# Patient Record
Sex: FEMALE | Race: BLACK OR AFRICAN AMERICAN | NOT HISPANIC OR LATINO
[De-identification: names, ages, dates, MRNs, and addresses within clinical notes are randomized per-mention and may not be internally consistent; named-entity substitution may affect disease eponyms.]

---

## 2017-01-08 ENCOUNTER — FORM ENCOUNTER (OUTPATIENT)
Age: 42
End: 2017-01-08

## 2017-01-09 ENCOUNTER — OUTPATIENT (OUTPATIENT)
Dept: OUTPATIENT SERVICES | Facility: HOSPITAL | Age: 42
LOS: 1 days | End: 2017-01-09
Payer: COMMERCIAL

## 2017-01-09 ENCOUNTER — APPOINTMENT (OUTPATIENT)
Dept: ORTHOPEDIC SURGERY | Facility: CLINIC | Age: 42
End: 2017-01-09

## 2017-01-09 DIAGNOSIS — Z98.89 OTHER SPECIFIED POSTPROCEDURAL STATES: Chronic | ICD-10-CM

## 2017-01-09 DIAGNOSIS — Z78.9 OTHER SPECIFIED HEALTH STATUS: ICD-10-CM

## 2017-01-09 PROCEDURE — 73564 X-RAY EXAM KNEE 4 OR MORE: CPT | Mod: 26,LT

## 2017-01-09 PROCEDURE — 73564 X-RAY EXAM KNEE 4 OR MORE: CPT

## 2017-01-09 RX ORDER — VALACYCLOVIR HYDROCHLORIDE 1 G/1
TABLET, FILM COATED ORAL
Refills: 0 | Status: ACTIVE | COMMUNITY

## 2017-01-09 RX ORDER — TOPIRAMATE 50 MG/1
TABLET, FILM COATED ORAL
Refills: 0 | Status: ACTIVE | COMMUNITY

## 2017-01-09 RX ORDER — LEVONORGESTREL AND ETHINYL ESTRADIOL 6-5-10
KIT ORAL
Refills: 0 | Status: ACTIVE | COMMUNITY

## 2017-01-09 RX ORDER — MULTIVITAMIN
TABLET ORAL
Refills: 0 | Status: ACTIVE | COMMUNITY

## 2017-01-09 RX ORDER — IBUPROFEN 800 MG
TABLET ORAL
Refills: 0 | Status: ACTIVE | COMMUNITY

## 2017-02-12 ENCOUNTER — FORM ENCOUNTER (OUTPATIENT)
Age: 42
End: 2017-02-12

## 2017-02-13 ENCOUNTER — APPOINTMENT (OUTPATIENT)
Dept: ORTHOPEDIC SURGERY | Facility: CLINIC | Age: 42
End: 2017-02-13

## 2017-02-13 ENCOUNTER — OUTPATIENT (OUTPATIENT)
Dept: OUTPATIENT SERVICES | Facility: HOSPITAL | Age: 42
LOS: 1 days | End: 2017-02-13
Payer: COMMERCIAL

## 2017-02-13 DIAGNOSIS — M25.551 PAIN IN RIGHT HIP: ICD-10-CM

## 2017-02-13 DIAGNOSIS — Z98.89 OTHER SPECIFIED POSTPROCEDURAL STATES: Chronic | ICD-10-CM

## 2017-02-13 DIAGNOSIS — M70.61 TROCHANTERIC BURSITIS, RIGHT HIP: ICD-10-CM

## 2017-02-13 DIAGNOSIS — M16.11 UNILATERAL PRIMARY OSTEOARTHRITIS, RIGHT HIP: ICD-10-CM

## 2017-02-13 PROCEDURE — 73502 X-RAY EXAM HIP UNI 2-3 VIEWS: CPT

## 2017-02-13 PROCEDURE — 73503 X-RAY EXAM HIP UNI 4/> VIEWS: CPT | Mod: 26,RT

## 2017-02-27 ENCOUNTER — APPOINTMENT (OUTPATIENT)
Dept: ORTHOPEDIC SURGERY | Facility: CLINIC | Age: 42
End: 2017-02-27

## 2017-03-27 ENCOUNTER — APPOINTMENT (OUTPATIENT)
Dept: ORTHOPEDIC SURGERY | Facility: CLINIC | Age: 42
End: 2017-03-27

## 2017-07-07 ENCOUNTER — APPOINTMENT (OUTPATIENT)
Dept: ENDOCRINOLOGY | Facility: CLINIC | Age: 42
End: 2017-07-07
Payer: COMMERCIAL

## 2017-07-07 VITALS
BODY MASS INDEX: 24.63 KG/M2 | DIASTOLIC BLOOD PRESSURE: 58 MMHG | HEART RATE: 52 BPM | SYSTOLIC BLOOD PRESSURE: 109 MMHG | WEIGHT: 139 LBS | HEIGHT: 63 IN

## 2017-07-07 DIAGNOSIS — E16.2 HYPOGLYCEMIA, UNSPECIFIED: ICD-10-CM

## 2017-07-07 PROCEDURE — 99204 OFFICE O/P NEW MOD 45 MIN: CPT | Mod: GC

## 2017-07-07 RX ORDER — BLOOD SUGAR DIAGNOSTIC
STRIP MISCELLANEOUS
Qty: 100 | Refills: 0 | Status: ACTIVE | COMMUNITY
Start: 2017-05-26

## 2017-07-07 RX ORDER — SYRINGE AND NEEDLE,INSULIN,1ML 31 GX5/16"
W/DEVICE SYRINGE, EMPTY DISPOSABLE MISCELLANEOUS
Qty: 1 | Refills: 0 | Status: ACTIVE | COMMUNITY
Start: 2017-05-26

## 2017-07-10 LAB
25(OH)D3 SERPL-MCNC: 32.2 NG/ML
ALBUMIN SERPL ELPH-MCNC: 4.4 G/DL
ALP BLD-CCNC: 44 U/L
ALT SERPL-CCNC: 15 U/L
ANION GAP SERPL CALC-SCNC: 16 MMOL/L
AST SERPL-CCNC: 23 U/L
BASOPHILS # BLD AUTO: 0.02 K/UL
BASOPHILS NFR BLD AUTO: 0.2 %
BILIRUB SERPL-MCNC: 0.2 MG/DL
BUN SERPL-MCNC: 15 MG/DL
C PEPTIDE SERPL-MCNC: 0.9 NG/ML
CALCIUM SERPL-MCNC: 9.5 MG/DL
CHLORIDE SERPL-SCNC: 107 MMOL/L
CHOLEST SERPL-MCNC: 189 MG/DL
CHOLEST/HDLC SERPL: 2.4 RATIO
CO2 SERPL-SCNC: 21 MMOL/L
CORTIS SERPL-MCNC: 22.8 UG/DL
CREAT SERPL-MCNC: 0.95 MG/DL
EOSINOPHIL # BLD AUTO: 0.03 K/UL
EOSINOPHIL NFR BLD AUTO: 0.4 %
GLUCOSE SERPL-MCNC: 73 MG/DL
HBA1C MFR BLD HPLC: 5.2 %
HCT VFR BLD CALC: 37.7 %
HDLC SERPL-MCNC: 80 MG/DL
HGB BLD-MCNC: 12.5 G/DL
IMM GRANULOCYTES NFR BLD AUTO: 0.2 %
INSULIN SERPL-MCNC: 2.9 UU/ML
LDLC SERPL CALC-MCNC: 98 MG/DL
LYMPHOCYTES # BLD AUTO: 2.09 K/UL
LYMPHOCYTES NFR BLD AUTO: 24.9 %
MAN DIFF?: NORMAL
MCHC RBC-ENTMCNC: 32 PG
MCHC RBC-ENTMCNC: 33.2 GM/DL
MCV RBC AUTO: 96.4 FL
MONOCYTES # BLD AUTO: 0.49 K/UL
MONOCYTES NFR BLD AUTO: 5.8 %
NEUTROPHILS # BLD AUTO: 5.74 K/UL
NEUTROPHILS NFR BLD AUTO: 68.5 %
PLATELET # BLD AUTO: 263 K/UL
POTASSIUM SERPL-SCNC: 4.3 MMOL/L
PROT SERPL-MCNC: 7.6 G/DL
RBC # BLD: 3.91 M/UL
RBC # FLD: 13.5 %
SODIUM SERPL-SCNC: 144 MMOL/L
TRIGL SERPL-MCNC: 55 MG/DL
TSH SERPL-ACNC: 2 UIU/ML
WBC # FLD AUTO: 8.39 K/UL

## 2017-07-12 LAB — PROINSULIN SERPL-MCNC: 3 PMOL/L

## 2018-03-05 ENCOUNTER — APPOINTMENT (OUTPATIENT)
Dept: SURGERY | Facility: CLINIC | Age: 43
End: 2018-03-05
Payer: COMMERCIAL

## 2018-03-05 VITALS
HEIGHT: 63 IN | DIASTOLIC BLOOD PRESSURE: 73 MMHG | TEMPERATURE: 98.3 F | HEART RATE: 51 BPM | SYSTOLIC BLOOD PRESSURE: 113 MMHG | WEIGHT: 150 LBS | BODY MASS INDEX: 26.58 KG/M2

## 2018-03-05 DIAGNOSIS — Z83.3 FAMILY HISTORY OF DIABETES MELLITUS: ICD-10-CM

## 2018-03-05 DIAGNOSIS — Z86.2 PERSONAL HISTORY OF DISEASES OF THE BLOOD AND BLOOD-FORMING ORGANS AND CERTAIN DISORDERS INVOLVING THE IMMUNE MECHANISM: ICD-10-CM

## 2018-03-05 DIAGNOSIS — Z82.49 FAMILY HISTORY OF ISCHEMIC HEART DISEASE AND OTHER DISEASES OF THE CIRCULATORY SYSTEM: ICD-10-CM

## 2018-03-05 DIAGNOSIS — Z86.69 PERSONAL HISTORY OF OTHER DISEASES OF THE NERVOUS SYSTEM AND SENSE ORGANS: ICD-10-CM

## 2018-03-05 PROCEDURE — 99213 OFFICE O/P EST LOW 20 MIN: CPT

## 2019-03-28 ENCOUNTER — APPOINTMENT (OUTPATIENT)
Dept: NEUROLOGY | Facility: CLINIC | Age: 44
End: 2019-03-28
Payer: COMMERCIAL

## 2019-03-28 VITALS
WEIGHT: 155 LBS | OXYGEN SATURATION: 100 % | HEIGHT: 63 IN | HEART RATE: 50 BPM | TEMPERATURE: 97.9 F | DIASTOLIC BLOOD PRESSURE: 71 MMHG | BODY MASS INDEX: 27.46 KG/M2 | SYSTOLIC BLOOD PRESSURE: 127 MMHG

## 2019-03-28 PROCEDURE — 99204 OFFICE O/P NEW MOD 45 MIN: CPT

## 2019-03-28 RX ORDER — GLUCOSAMINE/MSM/CHONDROITIN A 500-83-400
500-400-422-83 TABLET ORAL
Refills: 0 | Status: ACTIVE | COMMUNITY

## 2019-03-28 NOTE — HISTORY OF PRESENT ILLNESS
[FreeTextEntry1] : 43 yo W referred by Dr. Savage for evaluation of numbness and tingling in her hands and feet. Symptoms started a few months ago, getting more frequent, worse at night when she's resting, bilateral. Symptoms in hands are worse in 4th and 5th fingers more so on the left. She saw Dr. Cottrell recently who diagnosed her with "diffuse connective tissue disease". She had episodes of hypoglycemia in the past that have now resolved. She also has some joint pain in her fingers. \par \par She was prediabetic about 10 years ago, lost weight and now A1C is normal. She was also taking B12 shots at some point many years ago. \par \par She has a history of fibromyalgia for years. \par \par 10 pt review of systems performed and reviewed with patient\par Past medical history, surgical history, social history, and family history reviewed with patient\par See scanned document for details

## 2019-03-28 NOTE — CONSULT LETTER
[Dear  ___] : Dear  [unfilled], [Consult Letter:] : I had the pleasure of evaluating your patient, [unfilled]. [Please see my note below.] : Please see my note below. [Consult Closing:] : Thank you very much for allowing me to participate in the care of this patient.  If you have any questions, please do not hesitate to contact me. [Sincerely,] : Sincerely, [FreeTextEntry3] : Jacobo Thapa M.D.\par Neurology, Electromyography and Neuromuscular Medicine\par [facility]\par \par  of Neurology\par [facility] / [facility] [employer] [DrMariaelena  ___] : Dr. POLLOCK

## 2019-03-28 NOTE — PHYSICAL EXAM
[FreeTextEntry1] : Gen: appears well, well-nourished, no acute distress\par MS: awake, alert, speech fluent, comprehension intact, follows commands\par CN: PERRL, EOMI, visual fields full, facial strength and sensation intact and symmetric, palate elevation symmetric, tongue midline, no tongue atrophy or fasciculations\par Motor: normal bulk and tone, 5/5 strength throughout, no abnormal movements\par Sensory: Vibration mildly diminished at toes and ankles, PP diminished in length-dependent manner up to ankles b/l; decreased LT/PP first 3 fingers both hands\par Reflexes: 3+ symmetric throughout, no Sharif’s sign, plantar responses flexor bilaterally\par Coordination: no dysmetria on finger to nose, Romberg negative\par Gait: normal, can tandem without difficulty\par Skin: no rash on extremities\par Ext: no edema\par CV: regular rate\par Ophtho: fundi not visualized\par Tinel's sign mildly positive at right elbow, negative left elbow and b/l wrists

## 2019-03-28 NOTE — ASSESSMENT
[FreeTextEntry1] : History and exam consistent with polyneuropathy (large and small fiber) with possible superimposed carpal tunnel and/or ulnar nerve entrapments\par Not bothering her enough to take medication\par Trial nocturnal wrist splinting\par Will contact Dr. Cottrell's office for lab results\par Return for NCS/EMG

## 2019-06-13 ENCOUNTER — APPOINTMENT (OUTPATIENT)
Dept: NEUROLOGY | Facility: CLINIC | Age: 44
End: 2019-06-13
Payer: COMMERCIAL

## 2019-06-13 VITALS
SYSTOLIC BLOOD PRESSURE: 114 MMHG | DIASTOLIC BLOOD PRESSURE: 70 MMHG | TEMPERATURE: 98 F | OXYGEN SATURATION: 100 % | HEART RATE: 64 BPM | BODY MASS INDEX: 28.17 KG/M2 | WEIGHT: 159 LBS | HEIGHT: 63 IN

## 2019-06-13 DIAGNOSIS — R20.2 PARESTHESIA OF SKIN: ICD-10-CM

## 2019-06-13 DIAGNOSIS — Z87.39 PERSONAL HISTORY OF OTHER DISEASES OF THE MUSCULOSKELETAL SYSTEM AND CONNECTIVE TISSUE: ICD-10-CM

## 2019-06-13 PROCEDURE — 95912 NRV CNDJ TEST 11-12 STUDIES: CPT

## 2019-06-13 PROCEDURE — 95886 MUSC TEST DONE W/N TEST COMP: CPT

## 2019-06-13 PROCEDURE — 99214 OFFICE O/P EST MOD 30 MIN: CPT

## 2019-06-13 NOTE — HISTORY OF PRESENT ILLNESS
[FreeTextEntry1] : Still has numbness in pinky fingers, worse on the left\par Has not used braces\par CK repeated at Dr. Cottrell's office this week and was normal; she reports exercising intensely around the time that prior CK was drawn and found to be elevated

## 2019-06-13 NOTE — CONSULT LETTER
[Dear  ___] : Dear  [unfilled], [Courtesy Letter:] : I had the pleasure of seeing your patient, [unfilled], in my office today. [Please see my note below.] : Please see my note below. [Sincerely,] : Sincerely, [Consult Closing:] : Thank you very much for allowing me to participate in the care of this patient.  If you have any questions, please do not hesitate to contact me. [FreeTextEntry3] : Jacobo Thapa M.D.\par Neurology, Electromyography and Neuromuscular Medicine\par [facility]\par \par  of Neurology\par \[facility] [place] [facility]\"" / [facility] [employer]

## 2019-06-13 NOTE — CONSULT LETTER
[Dear  ___] : Dear  [unfilled], [Please see my note below.] : Please see my note below. [Courtesy Letter:] : I had the pleasure of seeing your patient, [unfilled], in my office today. [Sincerely,] : Sincerely, [Consult Closing:] : Thank you very much for allowing me to participate in the care of this patient.  If you have any questions, please do not hesitate to contact me. [FreeTextEntry3] : Jacobo Thapa M.D.\par Neurology, Electromyography and Neuromuscular Medicine\par [facility]\par \par  of Neurology\par [facility] / [facility] [employer]

## 2019-06-13 NOTE — ASSESSMENT
[FreeTextEntry1] : NCS largely unremarkable, perhaps mild left ulnar neuropathy but non-localizable. Suggest left elbow bracing at night for diagnostic and therapeutic purposes\par No median nerve entrapment, polyneuropathy, or myopathy\par Return if symptoms worse or new neurologic symptoms develop\par \par See separate procedure note for full results of study.\par

## 2019-06-13 NOTE — PROCEDURE
[FreeTextEntry1] : Nerve Conduction and Electromyography Report [FreeTextEntry3] : Electro Physiologic Findings:\par \par Limb temperature was monitored and maintained at approximately 36° C in the upper extremities.\par \par The median sensory, motor, and mixed nerve responses were normal bilaterally and symmetric. The ulnar sensory and motor responses were also normal bilaterally, although the dorsal ulnar cutaneous sensory amplitude was somewhat lower on the left than it was on the right. The median and ulnar F-wave latencies were normal bilaterally and symmetric. \par \par Needle electromyography was performed on select left upper extremity C7-T1 appendicular muscles and the left C8 paraspinal. All muscles tested were normal, although evaluation of the paraspinal was somewhat limited due to imperfect relaxation. \par \par Clinical Electrophysiological Impression: \par \par This was an unremarkable electrodiagnostic study of the upper extremities. There was one subtle asymmetry that potentially suggests a mild left ulnar neuropathy proximal to the wrist, however this was not definitive. There was no median entrapment, left lower cervical radiculopathy, myopathy, or upper extremity polyneuropathy on this study.

## 2019-06-13 NOTE — PHYSICAL EXAM
[FreeTextEntry1] : Gen: appears well, well-nourished, no acute distress\par MS: awake, alert, speech fluent, comprehension intact, follows commands\par Motor: normal bulk and tone, 5/5 strength throughout, no abnormal movements\par Sensory: diminished in 5th finger on left\par Reflexes: 2+ symmetric throughout, no Sharif’s sign\par Coordination: no dysmetria on finger to nose, Romberg negative\par Gait: normal\par Tinel's sign negative at elbows and wrists

## 2019-06-13 NOTE — CONSULT LETTER
[Dear  ___] : Dear  [unfilled], [Courtesy Letter:] : I had the pleasure of seeing your patient, [unfilled], in my office today. [Please see my note below.] : Please see my note below. [Consult Closing:] : Thank you very much for allowing me to participate in the care of this patient.  If you have any questions, please do not hesitate to contact me. [Sincerely,] : Sincerely, [FreeTextEntry3] : Jacobo Thapa M.D.\par Neurology, Electromyography and Neuromuscular Medicine\par [facility]\par \par  of Neurology\par [facility] / [facility] [employer]

## 2019-07-09 ENCOUNTER — APPOINTMENT (OUTPATIENT)
Dept: ORTHOPEDIC SURGERY | Facility: CLINIC | Age: 44
End: 2019-07-09
Payer: COMMERCIAL

## 2019-07-09 VITALS
HEIGHT: 63 IN | OXYGEN SATURATION: 99 % | DIASTOLIC BLOOD PRESSURE: 80 MMHG | BODY MASS INDEX: 28.17 KG/M2 | SYSTOLIC BLOOD PRESSURE: 118 MMHG | WEIGHT: 159 LBS | HEART RATE: 65 BPM

## 2019-07-09 DIAGNOSIS — M17.12 UNILATERAL PRIMARY OSTEOARTHRITIS, LEFT KNEE: ICD-10-CM

## 2019-07-09 PROCEDURE — 99214 OFFICE O/P EST MOD 30 MIN: CPT

## 2019-07-12 NOTE — PHYSICAL EXAM
[de-identified] : left knee:\par medial compartment tenderness\par stable v/v\par stable lachman\par negative mcmurrays\par ROM 0-120 [de-identified] : bilateral knee tricompartmental OA grade 2-3. patellas midline. marginal osteophytes.

## 2019-07-12 NOTE — REASON FOR VISIT
[Initial Visit] : an initial visit for [Osteoarthritis, Knee] : osteoarthritis, knee [FreeTextEntry2] : left knee

## 2019-07-12 NOTE — ASSESSMENT
[FreeTextEntry1] : plan:\par -exercises provided\par -she was enrolled in the study\par -she will follow up per protocol

## 2019-07-12 NOTE — REVIEW OF SYSTEMS
[Joint Pain] : joint pain [Joint Swelling] : joint swelling [Negative] : Endocrine [FreeTextEntry9] : left knee

## 2019-07-12 NOTE — HISTORY OF PRESENT ILLNESS
[de-identified] : 44 female with left knee pain for 4 months after beginning crossfit training with lunges and deep squats. she has moderate (grade 2-3) osteoarthritis of the knee. she is averse to medications and invasive treatment and would like to manage this pain naturally. her pain is mostly with stairs and rising from a chair.

## 2019-07-23 ENCOUNTER — APPOINTMENT (OUTPATIENT)
Dept: ORTHOPEDIC SURGERY | Facility: CLINIC | Age: 44
End: 2019-07-23
Payer: COMMERCIAL

## 2019-07-23 PROCEDURE — 99214 OFFICE O/P EST MOD 30 MIN: CPT

## 2019-07-28 NOTE — HISTORY OF PRESENT ILLNESS
[de-identified] : Ms. Skelton injured her dominant right shoulder 2 months ago after doing handstands in a cross fit exercise program. She continues to have pain in the shoulder at night and interrupts her sleep and difficulty using the arm for activities of daily living requiring overhead use. She takes no medicines for pain and has had to continue to curtail her exercise routine. 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

## 2019-07-28 NOTE — CONSULT LETTER
[Dear  ___] : Dear  [unfilled], [FreeTextEntry1] : Today I had the pleasure of evaluating your very nice patient MARILUZ RAMIREZ who requested that I share my findings with you. I very much appreciate the referral. \par \par Please review my office note below and, needless to say, please call or email me with any questions or concerns.\par \par I appreciate the opportunity to participate in her care.\par \par Sincerely,\par \par Rip De La Cruz MD\par Director, Orthopaedic Surgery\par and [facility] [employer] \par formerly [facility]\par Office: [phone]\par Cell: [phone]\par Email: [email or web address] Website: [email or web address] \par \par \par \par

## 2019-07-28 NOTE — DISCUSSION/SUMMARY
[Medication Risks Reviewed] : Medication risks reviewed [Surgical risks reviewed] : Surgical risks reviewed [de-identified] : I reviewed radiographs of both shoulders performed on 6-26-19 that showed no evidence of fracture or dislocation and a well-preserved glenohumeral joint space.\par \par Using a plastic shoulder model, I reviewed the anatomy of the rotator cuff muscles and the glenohumeral joint. I then explained the pathophysiology of impingement syndrome, commonly referred to as bursitis of the shoulder, and the fact that 80% of patients respond to nonoperative treatment.\par \par I then reviewed the nonoperative treatment program which includes activity modification to avoid those activities that exacerbate shoulder pain, Tylenol or anti-inflammatory medications as necessary, rehabilitation exercises focusing on shoulder stretching and rotator cuff muscle strengthening, either independently or with a physical therapist, and, a subacromial cortisone injection for those patients who remain symptomatic following an exercise program. \par \par Finally, I explained that those patients who remain symptomatic after a minimum of 3-4 months of nonoperative treatment and continue to have shoulder pain at night that interrupts sleep and compromises activities of everyday life or sporting activities requiring overhead use are candidates for surgical treatment.\par \par I then gave instructions in a home exercise program for the rotator cuff muscles emphasizing stretching and strengthening of the shoulder and also gave my home exercise sheet which includes illustrations of the exercises. I also reviewed a closed chain strengthening program that may be continued provided that these exercises are performed below shoulder level and at an intensity that does not cause pain. Many patients are cured on this simple rehabilitation exercise program alone.\par I recommended that she begin a course of Tylenol and Aleve to relieve her pain and explained that she may engage in closed chain exercises below shoulder level that do not cause pain.\par \par She should return for my repeat evaluation in 6 weeks if she remains symptomatic at which time I would recommend a subacromial cortisone injection and an MRI to rule out a rotator cuff tear.\par \par Today her clinical right shoulder [employer] score is 58 on a scale of 100 indicating poor shoulder function.\par \par \par

## 2019-07-28 NOTE — PHYSICAL EXAM
[de-identified] : ACONSTITUTIONAL: Patient is well-developed, well-nourished and appropriately groomed.\par SKIN: There are no rashes, no ulcers, and no café au lait spots in the upper extremities, face or cervical region.\par CARDIOVASCULAR: Both distal upper extremities are warm and the fingers have good capillary refill. Normal radial pulses bilaterally.\par RESPIRATORY: The patient is breathing normally and in no acute distress. \par HEMATOLOGICAL/LYMPHATIC: There is no lymphedema in either upper extremity. No cervical adenopathy, no axillary adenopathy.\par PSYCH: The patient is alert and oriented x 3;  appropriately groomed and dressed.\par NEUROLOGIC: Normal gait and station.\par MUSCULOSKELETAL:\par Right shoulder has 160° passive forward elevation, 60° external rotation, and internal rotation to the 12th thoracic vertebra. She can fully actively raise the arm but this is painful. Strength of external rotation is normal and there is no apprehension. She has tenderness and crepitus in the subacromial region, a positive arc of pain, and a positive impingement sign but no tenderness over the acromioclavicular joint.\par \par The contralateral shoulder has full, painless active and passive range of motion with 170° forward elevation, 70° external rotation, and internal rotation to the seventh thoracic vertebra. There is no focal tenderness and no crepitus with passive rotation of the glenohumeral joint.\par \par The cervical spine has full passive and active range of motion without pain. There is no focal tenderness in the paracervical muscles nor in the trapezius or parascapular muscles. Distally the motor and sensory exam is normal in both upper extremities.\par \par Distally, the hands are warm and well perfused with no signs of lymphedema or swelling. The radial pulse is present and normal.

## 2019-08-16 ENCOUNTER — APPOINTMENT (OUTPATIENT)
Dept: INTERNAL MEDICINE | Facility: CLINIC | Age: 44
End: 2019-08-16
Payer: SELF-PAY

## 2019-08-16 VITALS — WEIGHT: 158.75 LBS | BODY MASS INDEX: 28.13 KG/M2 | HEIGHT: 63 IN

## 2019-08-16 PROCEDURE — 00006N: CUSTOM

## 2019-09-12 ENCOUNTER — APPOINTMENT (OUTPATIENT)
Dept: INTERNAL MEDICINE | Facility: CLINIC | Age: 44
End: 2019-09-12
Payer: SELF-PAY

## 2019-09-12 PROCEDURE — 00010N: CUSTOM

## 2019-09-17 ENCOUNTER — APPOINTMENT (OUTPATIENT)
Dept: INTERNAL MEDICINE | Facility: CLINIC | Age: 44
End: 2019-09-17

## 2019-09-17 ENCOUNTER — APPOINTMENT (OUTPATIENT)
Dept: ORTHOPEDIC SURGERY | Facility: CLINIC | Age: 44
End: 2019-09-17
Payer: COMMERCIAL

## 2019-09-17 PROCEDURE — 20611 DRAIN/INJ JOINT/BURSA W/US: CPT | Mod: RT

## 2019-09-17 PROCEDURE — 99212 OFFICE O/P EST SF 10 MIN: CPT | Mod: 25

## 2019-10-11 VITALS — WEIGHT: 156.5 LBS | BODY MASS INDEX: 27.72 KG/M2

## 2019-11-01 ENCOUNTER — APPOINTMENT (OUTPATIENT)
Dept: ORTHOPEDIC SURGERY | Facility: CLINIC | Age: 44
End: 2019-11-01
Payer: COMMERCIAL

## 2019-11-01 VITALS — WEIGHT: 156 LBS | HEIGHT: 63 IN | BODY MASS INDEX: 27.64 KG/M2 | RESPIRATION RATE: 16 BRPM

## 2019-11-01 DIAGNOSIS — M75.41 IMPINGEMENT SYNDROME OF RIGHT SHOULDER: ICD-10-CM

## 2019-11-01 PROCEDURE — 99212 OFFICE O/P EST SF 10 MIN: CPT

## 2019-12-13 VITALS — BODY MASS INDEX: 27.1 KG/M2 | WEIGHT: 153 LBS

## 2019-12-24 ENCOUNTER — APPOINTMENT (OUTPATIENT)
Dept: INTERNAL MEDICINE | Facility: CLINIC | Age: 44
End: 2019-12-24
Payer: COMMERCIAL

## 2019-12-24 PROCEDURE — 97803 MED NUTRITION INDIV SUBSEQ: CPT

## 2020-01-24 VITALS — BODY MASS INDEX: 28.41 KG/M2 | WEIGHT: 160.38 LBS

## 2020-01-27 ENCOUNTER — FORM ENCOUNTER (OUTPATIENT)
Age: 45
End: 2020-01-27

## 2020-01-28 ENCOUNTER — OUTPATIENT (OUTPATIENT)
Dept: OUTPATIENT SERVICES | Facility: HOSPITAL | Age: 45
LOS: 1 days | End: 2020-01-28
Payer: COMMERCIAL

## 2020-01-28 ENCOUNTER — APPOINTMENT (OUTPATIENT)
Dept: ORTHOPEDIC SURGERY | Facility: CLINIC | Age: 45
End: 2020-01-28
Payer: COMMERCIAL

## 2020-01-28 DIAGNOSIS — Z98.89 OTHER SPECIFIED POSTPROCEDURAL STATES: Chronic | ICD-10-CM

## 2020-01-28 DIAGNOSIS — M17.12 UNILATERAL PRIMARY OSTEOARTHRITIS, LEFT KNEE: ICD-10-CM

## 2020-01-28 PROCEDURE — 99213 OFFICE O/P EST LOW 20 MIN: CPT | Mod: 25

## 2020-01-28 PROCEDURE — 73562 X-RAY EXAM OF KNEE 3: CPT

## 2020-01-28 PROCEDURE — 20610 DRAIN/INJ JOINT/BURSA W/O US: CPT | Mod: LT

## 2020-01-28 PROCEDURE — 73562 X-RAY EXAM OF KNEE 3: CPT | Mod: 26,50

## 2020-02-03 NOTE — ASSESSMENT
[FreeTextEntry1] : Assessment/Plan\par \par Left knee OA \par \par The left knee was injected as described above, today they will rest ice and use Tylenol as needed for pain. \par \par F/U in 90 days \par \par \par \par \par All medical record entries made by the PA/Mary/Fellow are at my, Dr. Francois Joshi's direction and personally dictated by me on 01/28/2020]. I have reviewed the chart and agree that the record accurately reflects my personal performance of the history, physical exam, assessment, and plan. I have also personally directed reviewed, and agreed with the chart.\par

## 2020-02-03 NOTE — END OF VISIT
[FreeTextEntry3] : By signing my name below, I, Renee Ramos, attest that this documentation has been prepared under the direction and in the presence of Mane Marie PA-C.

## 2020-02-03 NOTE — PROCEDURE
[de-identified] : After obtaining verbal consent and under normal sterile conditions the left knee joint was injected with 4 cc of lidocaine and 1 cc of 40 mg per mL of Kenalog.\par \par

## 2020-02-03 NOTE — PHYSICAL EXAM
[de-identified] : General: Not in acute distress, dressed appropriately, sitting on examination table\par Skin: Warm and dry, normal turgor, no rashes\par Neurological: AOx3, Cranial nerves grossly in tact\par Psych: Mood and affect appropriate\par \par Left knee:\par Medial compartment tenderness\par Stable v/v\par Stable lachman\par Negative mcmurrays\par ROM 0-120

## 2020-02-03 NOTE — HISTORY OF PRESENT ILLNESS
[de-identified] : Pari is a 44 year old female here for follow up of left knee pain. She is requesting a left knee injection today. Patient has completed research study.

## 2020-02-13 ENCOUNTER — EMERGENCY (EMERGENCY)
Facility: HOSPITAL | Age: 45
LOS: 1 days | Discharge: HOME | End: 2020-02-13
Attending: EMERGENCY MEDICINE | Admitting: EMERGENCY MEDICINE
Payer: COMMERCIAL

## 2020-02-13 ENCOUNTER — NON-APPOINTMENT (OUTPATIENT)
Age: 45
End: 2020-02-13

## 2020-02-13 VITALS
WEIGHT: 160.06 LBS | TEMPERATURE: 98 F | OXYGEN SATURATION: 99 % | SYSTOLIC BLOOD PRESSURE: 119 MMHG | HEART RATE: 63 BPM | DIASTOLIC BLOOD PRESSURE: 82 MMHG | HEIGHT: 63 IN | RESPIRATION RATE: 18 BRPM

## 2020-02-13 VITALS
SYSTOLIC BLOOD PRESSURE: 119 MMHG | RESPIRATION RATE: 18 BRPM | TEMPERATURE: 98 F | DIASTOLIC BLOOD PRESSURE: 70 MMHG | OXYGEN SATURATION: 100 % | HEART RATE: 61 BPM

## 2020-02-13 DIAGNOSIS — R00.2 PALPITATIONS: ICD-10-CM

## 2020-02-13 DIAGNOSIS — Z98.89 OTHER SPECIFIED POSTPROCEDURAL STATES: Chronic | ICD-10-CM

## 2020-02-13 DIAGNOSIS — R07.89 OTHER CHEST PAIN: ICD-10-CM

## 2020-02-13 LAB
ALBUMIN SERPL ELPH-MCNC: 4.3 G/DL — SIGNIFICANT CHANGE UP (ref 3.3–5)
ALP SERPL-CCNC: 63 U/L — SIGNIFICANT CHANGE UP (ref 40–120)
ALT FLD-CCNC: 12 U/L — SIGNIFICANT CHANGE UP (ref 10–45)
ANION GAP SERPL CALC-SCNC: 10 MMOL/L — SIGNIFICANT CHANGE UP (ref 5–17)
APPEARANCE UR: CLEAR — SIGNIFICANT CHANGE UP
AST SERPL-CCNC: 20 U/L — SIGNIFICANT CHANGE UP (ref 10–40)
BASOPHILS # BLD AUTO: 0.06 K/UL — SIGNIFICANT CHANGE UP (ref 0–0.2)
BASOPHILS NFR BLD AUTO: 0.6 % — SIGNIFICANT CHANGE UP (ref 0–2)
BILIRUB SERPL-MCNC: 0.2 MG/DL — SIGNIFICANT CHANGE UP (ref 0.2–1.2)
BILIRUB UR-MCNC: NEGATIVE — SIGNIFICANT CHANGE UP
BUN SERPL-MCNC: 19 MG/DL — SIGNIFICANT CHANGE UP (ref 7–23)
CALCIUM SERPL-MCNC: 9.2 MG/DL — SIGNIFICANT CHANGE UP (ref 8.4–10.5)
CHLORIDE SERPL-SCNC: 106 MMOL/L — SIGNIFICANT CHANGE UP (ref 96–108)
CO2 SERPL-SCNC: 24 MMOL/L — SIGNIFICANT CHANGE UP (ref 22–31)
COLOR SPEC: YELLOW — SIGNIFICANT CHANGE UP
CREAT SERPL-MCNC: 0.95 MG/DL — SIGNIFICANT CHANGE UP (ref 0.5–1.3)
DIFF PNL FLD: NEGATIVE — SIGNIFICANT CHANGE UP
EOSINOPHIL # BLD AUTO: 0.09 K/UL — SIGNIFICANT CHANGE UP (ref 0–0.5)
EOSINOPHIL NFR BLD AUTO: 0.9 % — SIGNIFICANT CHANGE UP (ref 0–6)
GLUCOSE SERPL-MCNC: 93 MG/DL — SIGNIFICANT CHANGE UP (ref 70–99)
GLUCOSE UR QL: NEGATIVE — SIGNIFICANT CHANGE UP
HCT VFR BLD CALC: 36.2 % — SIGNIFICANT CHANGE UP (ref 34.5–45)
HGB BLD-MCNC: 12 G/DL — SIGNIFICANT CHANGE UP (ref 11.5–15.5)
IMM GRANULOCYTES NFR BLD AUTO: 0.2 % — SIGNIFICANT CHANGE UP (ref 0–1.5)
KETONES UR-MCNC: NEGATIVE — SIGNIFICANT CHANGE UP
LEUKOCYTE ESTERASE UR-ACNC: NEGATIVE — SIGNIFICANT CHANGE UP
LYMPHOCYTES # BLD AUTO: 2.78 K/UL — SIGNIFICANT CHANGE UP (ref 1–3.3)
LYMPHOCYTES # BLD AUTO: 27.6 % — SIGNIFICANT CHANGE UP (ref 13–44)
MCHC RBC-ENTMCNC: 32 PG — SIGNIFICANT CHANGE UP (ref 27–34)
MCHC RBC-ENTMCNC: 33.1 GM/DL — SIGNIFICANT CHANGE UP (ref 32–36)
MCV RBC AUTO: 96.5 FL — SIGNIFICANT CHANGE UP (ref 80–100)
MONOCYTES # BLD AUTO: 0.77 K/UL — SIGNIFICANT CHANGE UP (ref 0–0.9)
MONOCYTES NFR BLD AUTO: 7.6 % — SIGNIFICANT CHANGE UP (ref 2–14)
NEUTROPHILS # BLD AUTO: 6.36 K/UL — SIGNIFICANT CHANGE UP (ref 1.8–7.4)
NEUTROPHILS NFR BLD AUTO: 63.1 % — SIGNIFICANT CHANGE UP (ref 43–77)
NITRITE UR-MCNC: NEGATIVE — SIGNIFICANT CHANGE UP
NRBC # BLD: 0 /100 WBCS — SIGNIFICANT CHANGE UP (ref 0–0)
PH UR: 6.5 — SIGNIFICANT CHANGE UP (ref 5–8)
PLATELET # BLD AUTO: 207 K/UL — SIGNIFICANT CHANGE UP (ref 150–400)
POTASSIUM SERPL-MCNC: 4.2 MMOL/L — SIGNIFICANT CHANGE UP (ref 3.5–5.3)
POTASSIUM SERPL-SCNC: 4.2 MMOL/L — SIGNIFICANT CHANGE UP (ref 3.5–5.3)
PROT SERPL-MCNC: 6.8 G/DL — SIGNIFICANT CHANGE UP (ref 6–8.3)
PROT UR-MCNC: NEGATIVE MG/DL — SIGNIFICANT CHANGE UP
RBC # BLD: 3.75 M/UL — LOW (ref 3.8–5.2)
RBC # FLD: 13.1 % — SIGNIFICANT CHANGE UP (ref 10.3–14.5)
SODIUM SERPL-SCNC: 140 MMOL/L — SIGNIFICANT CHANGE UP (ref 135–145)
SP GR SPEC: 1.02 — SIGNIFICANT CHANGE UP (ref 1–1.03)
TROPONIN T SERPL-MCNC: <0.01 NG/ML — SIGNIFICANT CHANGE UP (ref 0–0.01)
TROPONIN T SERPL-MCNC: <0.01 NG/ML — SIGNIFICANT CHANGE UP (ref 0–0.01)
UROBILINOGEN FLD QL: 0.2 E.U./DL — SIGNIFICANT CHANGE UP
WBC # BLD: 10.08 K/UL — SIGNIFICANT CHANGE UP (ref 3.8–10.5)
WBC # FLD AUTO: 10.08 K/UL — SIGNIFICANT CHANGE UP (ref 3.8–10.5)

## 2020-02-13 PROCEDURE — 93010 ELECTROCARDIOGRAM REPORT: CPT

## 2020-02-13 PROCEDURE — 80053 COMPREHEN METABOLIC PANEL: CPT

## 2020-02-13 PROCEDURE — 36415 COLL VENOUS BLD VENIPUNCTURE: CPT

## 2020-02-13 PROCEDURE — 84484 ASSAY OF TROPONIN QUANT: CPT

## 2020-02-13 PROCEDURE — 71046 X-RAY EXAM CHEST 2 VIEWS: CPT | Mod: 26

## 2020-02-13 PROCEDURE — 99283 EMERGENCY DEPT VISIT LOW MDM: CPT | Mod: 25

## 2020-02-13 PROCEDURE — 71046 X-RAY EXAM CHEST 2 VIEWS: CPT

## 2020-02-13 PROCEDURE — 85025 COMPLETE CBC W/AUTO DIFF WBC: CPT

## 2020-02-13 PROCEDURE — 81003 URINALYSIS AUTO W/O SCOPE: CPT

## 2020-02-13 PROCEDURE — 93005 ELECTROCARDIOGRAM TRACING: CPT

## 2020-02-13 PROCEDURE — 99285 EMERGENCY DEPT VISIT HI MDM: CPT | Mod: 25

## 2020-02-13 NOTE — ED ADULT NURSE NOTE - PSH
H/O dilation and curettage    H/O hernia repair    History of arthroscopy of left knee    History of lymph node biopsy  right axilla

## 2020-02-13 NOTE — ED PROVIDER NOTE - NSFOLLOWUPINSTRUCTIONS_ED_ALL_ED_FT
Heart Palpitations    WHAT YOU NEED TO KNOW:    What are heart palpitations? Heart palpitations are feelings that your heart races, jumps, throbs, or flutters. You may feel extra beats, no beats for a short time, or skipped beats. You may have these feelings in your chest, throat, or neck. They may happen when you are sitting, standing, or lying. Heart palpitations may be frightening, but are usually not caused by a serious problem.     What causes heart palpitations?     Anxiety, stress, or lack of sleep      Exercise      A fever or illness      Medicines, such as diet pills, certain cold and allergy medicines, and herbal supplements such as ginseng       Caffeine, nicotine, or illegal drugs such as cocaine      Pregnancy      Medical conditions, such has dehydration, thyroid disease, low blood sugar level, or anemia    How are heart palpitations diagnosed? Your healthcare provider will examine you and ask about your symptoms. Tell the provider if you smoke, use illegal drugs, or have a family history of heart problems. Rarely, heart palpitations may be caused by a more serious condition. Examples include a heart valve problem, heart failure, or a problem with how your heart beats. You may need tests to make sure your palpitations are not caused by a more serious problem:     Blood and urine tests measure your electrolyte, blood cell, and blood sugar levels. Your thyroid hormone levels may also be measured. If you are a woman, your blood or urine may be tested for pregnancy hormones.       An EKG test records your heart rhythm and how fast your heart beats. It is used to check for abnormal heart beats or heart damage. You may also need to wear a Holter monitor while you do your usual activities. A Holter monitor is a portable EKG that you may wear for 24 to 48 hours.     An echocardiogram is a type of ultrasound. Sound waves are used to show the structure and function of your heart.      An exercise stress test helps healthcare providers see how well your heart handles stress. The test can check for blockages in your heart or abnormal heartbeats. Ask your healthcare provider for more information about a stress test.       An electrophysiology study is a procedure to check the electrical pathways in your heart. The electrical pathways control your heartbeat.     How are heart palpitations treated? Palpitations usually do not need treatment. Your healthcare provider may stop or change your medicines if they are causing your palpitations. Conditions that cause palpitations, such as an abnormal heartbeat, will be treated.     What can I do to help prevent heart palpitations?     Manage stress and anxiety. Find ways to relax such as listening to music, meditating, or doing yoga. Exercise can also help decrease stress and anxiety. Talk to someone you trust about your stress or anxiety. You can also talk to a therapist.       Get plenty of sleep every night. Ask your healthcare provider how much sleep you need each night.       Do not drink caffeine or alcohol. Caffeine and alcohol can make your palpitations worse. Caffeine is found in soda, coffee, tea, chocolate, and drinks that increase your energy.       Do not smoke. Nicotine and other chemicals in cigarettes and cigars may damage your heart and blood vessels. Ask your healthcare provider for information if you currently smoke and need help to quit. E-cigarettes or smokeless tobacco still contain nicotine. Talk to your healthcare provider before you use these products.       Do not use illegal drugs. Talk to your healthcare provider if you use illegal drugs and want help to quit.     Call 911 or have someone else call for any of the following:     You have any of the following signs of a heart attack:   Squeezing, pressure, or pain in your chest      You may also have any of the following:   Discomfort or pain in your back, neck, jaw, stomach, or arm      Shortness of breath      Nausea or vomiting      Lightheadedness or a sudden cold sweat      You have any of the following signs of a stroke:   Numbness or drooping on one side of your face       Weakness in an arm or leg      Confusion or difficulty speaking      Dizziness, a severe headache, or vision loss      You faint or lose consciousness.     When should I seek immediate care?     Your palpitations happen more often or last longer than usual.       You have palpitations and shortness of breath, nausea, sweating, or dizziness.     When should I contact my healthcare provider?     You have questions or concerns about your condition or care.      CARE AGREEMENT:    You have the right to help plan your care. Learn about your health condition and how it may be treated. Discuss treatment options with your healthcare providers to decide what care you want to receive. You always have the right to refuse treatment.     Chest Pain    Chest pain can be caused by many different conditions which may or may not be dangerous. Causes include heartburn, lung infections, heart attack, blood clot in lungs, skin infections, strain or damage to muscle, cartilage, or bones, etc. In addition to a history and physical examination, an electrocardiogram (ECG) or other lab tests may have been performed to determine the cause of your chest pain. Follow up with your primary care provider or with a cardiologist as instructed.     SEEK IMMEDIATE MEDICAL CARE IF YOU HAVE ANY OF THE FOLLOWING SYMPTOMS: worsening chest pain, coughing up blood, unexplained back/neck/jaw pain, severe abdominal pain, dizziness or lightheadedness, fainting, shortness of breath, sweaty or clammy skin, vomiting, or racing heart beat. These symptoms may represent a serious problem that is an emergency. Do not wait to see if the symptoms will go away. Get medical help right away. Call 911 and do not drive yourself to the hospital.

## 2020-02-13 NOTE — ED PROVIDER NOTE - PATIENT PORTAL LINK FT
You can access the FollowMyHealth Patient Portal offered by [facility] by registering at the following website: [email or web address]. By joining [email or web address]’s FollowMyHealth portal, you will also be able to view your health information using other applications (apps) compatible with our system.

## 2020-02-13 NOTE — ED ADULT NURSE NOTE - FAMILY HISTORY
Father  Still living? Unknown  Family history of hypertension, Age at diagnosis: Age Unknown  Family history of CABG, Age at diagnosis: Age Unknown  Family history of heart disease, Age at diagnosis: Age Unknown  Family history of hyperlipidemia, Age at diagnosis: Age Unknown     Mother  Still living? Yes, Estimated age: Age Unknown  Family history of hypertension, Age at diagnosis: Age Unknown  Family history of hyperlipidemia, Age at diagnosis: Age Unknown     Grandparent  Still living? Yes, Estimated age: Age Unknown  Family history of hypertension, Age at diagnosis: Age Unknown  Family history of CABG, Age at diagnosis: Age Unknown  Family history of heart disease, Age at diagnosis: Age Unknown  Family history of diabetes mellitus (DM), Age at diagnosis: Age Unknown     Sibling  Still living? Yes, Estimated age: Age Unknown  Family history of borderline diabetes mellitus, Age at diagnosis: Age Unknown  Family history of autism in sibling, Age at diagnosis: Age Unknown

## 2020-02-13 NOTE — ED ADULT NURSE NOTE - CHPI ED NUR SYMPTOMS NEG
no back pain/no congestion/no chills/no dizziness/no nausea/no syncope/no shortness of breath/no diaphoresis/no fever/no vomiting

## 2020-02-13 NOTE — ED PROVIDER NOTE - CLINICAL SUMMARY MEDICAL DECISION MAKING FREE TEXT BOX
midsternal chest pain, intermittent palpitations, no tachycardia, no tachypnea, no hypoxia, doubt pe, doubt acs  -check labs, ekg  -cxr

## 2020-02-13 NOTE — ED PROVIDER NOTE - CARE PROVIDER_API CALL
Froylan Patterson)  Cardiac Electrophysiology; Cardiology; Cardiovascular Disease  [address], [address] [place] [address]  Phone: [phone]  Fax: [phone]  Follow Up Time:     Josiah Grove)  Cardiac Electrophysiology; Cardiovascular Disease  [address] [place] [address], [address] [place] [address]  Phone: [phone]  Fax: [phone]  Follow Up Time:

## 2020-02-13 NOTE — ED PROVIDER NOTE - OBJECTIVE STATEMENT
44F hx fibromyalgia c/o palpitations. pt states they started at rest tonight. states felt like heart was beating hard.  states sometimes felt like heart rate was slow.  some pain to midsternal region. no SOB. no vomiting, no dizziness. no LE swelling. no recent travel. no sick contacts.

## 2020-02-13 NOTE — ED ADULT NURSE NOTE - OBJECTIVE STATEMENT
pt. presents with c/o c/p and palpitations since 2000 last pm, h/o same with cards appointment scheduled, hasn't been able to follow up yet

## 2020-02-13 NOTE — ED ADULT NURSE NOTE - PMH
Borderline glaucoma    Fibromyalgia    Hernia    Herniated intervertebral disc of lumbar spine    Migraine headache    Pre-diabetes    Shingles

## 2020-02-13 NOTE — ED ADULT NURSE NOTE - NSIMPLEMENTINTERV_GEN_ALL_ED
Implemented All Universal Safety Interventions:  [place] to call system. Call bell, personal items and telephone within reach. Instruct patient to call for assistance. Room bathroom lighting operational. Non-slip footwear when patient is off stretcher. Physically safe environment: no spills, clutter or unnecessary equipment. Stretcher in lowest position, wheels locked, appropriate side rails in place.

## 2020-02-13 NOTE — ED PROVIDER NOTE - PROGRESS NOTE DETAILS
no tachycardia throughout ED stay, recommend f/u with cards. serial trop negative, doubt acute ACS, pt may benefit from holter monitor.  I have discussed the discharge plan with the patient. The patient agrees with the plan, as discussed.  The patient understands Emergency Department diagnosis is a preliminary diagnosis often based on limited information and that the patient must adhere to the follow-up plan as discussed.  The patient understands that if the symptoms worsen  the patient may return to the Emergency Department at any time for further evaluation and treatment.

## 2020-02-13 NOTE — ED PROVIDER NOTE - MUSCULOSKELETAL MINIMAL EXAM
[FreeTextEntry1] : The patient had trauma to his right testicle. There is no pain or swelling. There is no hernia. He has no palpitations and his EKG is normal.No treatment is necessary.
neck supple/atraumatic

## 2020-03-31 ENCOUNTER — APPOINTMENT (OUTPATIENT)
Dept: INTERNAL MEDICINE | Facility: CLINIC | Age: 45
End: 2020-03-31
Payer: SELF-PAY

## 2020-03-31 PROCEDURE — 97803 MED NUTRITION INDIV SUBSEQ: CPT | Mod: 95

## 2020-09-14 ENCOUNTER — APPOINTMENT (OUTPATIENT)
Dept: INTERNAL MEDICINE | Facility: CLINIC | Age: 45
End: 2020-09-14
Payer: SELF-PAY

## 2020-09-14 DIAGNOSIS — R73.03 PREDIABETES.: ICD-10-CM

## 2020-09-14 PROCEDURE — 97803 MED NUTRITION INDIV SUBSEQ: CPT | Mod: 95

## 2020-09-15 VITALS — BODY MASS INDEX: 30.29 KG/M2 | WEIGHT: 171 LBS

## 2020-09-15 PROBLEM — R73.03 PRE-DIABETES: Status: ACTIVE | Noted: 2017-07-07

## 2020-09-21 ENCOUNTER — NON-APPOINTMENT (OUTPATIENT)
Age: 45
End: 2020-09-21

## 2020-10-05 ENCOUNTER — NON-APPOINTMENT (OUTPATIENT)
Age: 45
End: 2020-10-05

## 2020-10-12 ENCOUNTER — NON-APPOINTMENT (OUTPATIENT)
Age: 45
End: 2020-10-12

## 2020-10-19 ENCOUNTER — NON-APPOINTMENT (OUTPATIENT)
Age: 45
End: 2020-10-19

## 2020-12-13 ENCOUNTER — EMERGENCY (EMERGENCY)
Facility: HOSPITAL | Age: 45
LOS: 1 days | Discharge: HOME | End: 2020-12-13
Attending: EMERGENCY MEDICINE | Admitting: EMERGENCY MEDICINE
Payer: COMMERCIAL

## 2020-12-13 VITALS
SYSTOLIC BLOOD PRESSURE: 115 MMHG | TEMPERATURE: 98 F | WEIGHT: 177.03 LBS | DIASTOLIC BLOOD PRESSURE: 76 MMHG | HEIGHT: 63 IN | HEART RATE: 68 BPM | OXYGEN SATURATION: 99 % | RESPIRATION RATE: 18 BRPM

## 2020-12-13 VITALS — HEART RATE: 66 BPM | DIASTOLIC BLOOD PRESSURE: 78 MMHG | SYSTOLIC BLOOD PRESSURE: 120 MMHG | TEMPERATURE: 98 F

## 2020-12-13 DIAGNOSIS — Z98.89 OTHER SPECIFIED POSTPROCEDURAL STATES: Chronic | ICD-10-CM

## 2020-12-13 LAB
ALBUMIN SERPL ELPH-MCNC: 4.4 G/DL — SIGNIFICANT CHANGE UP (ref 3.3–5)
ALP SERPL-CCNC: 68 U/L — SIGNIFICANT CHANGE UP (ref 40–120)
ALT FLD-CCNC: 11 U/L — SIGNIFICANT CHANGE UP (ref 10–45)
ANION GAP SERPL CALC-SCNC: 10 MMOL/L — SIGNIFICANT CHANGE UP (ref 5–17)
APTT BLD: 32.3 SEC — SIGNIFICANT CHANGE UP (ref 27.5–35.5)
AST SERPL-CCNC: 19 U/L — SIGNIFICANT CHANGE UP (ref 10–40)
BASOPHILS # BLD AUTO: 0.06 K/UL — SIGNIFICANT CHANGE UP (ref 0–0.2)
BASOPHILS NFR BLD AUTO: 0.5 % — SIGNIFICANT CHANGE UP (ref 0–2)
BILIRUB SERPL-MCNC: 0.2 MG/DL — SIGNIFICANT CHANGE UP (ref 0.2–1.2)
BUN SERPL-MCNC: 17 MG/DL — SIGNIFICANT CHANGE UP (ref 7–23)
CALCIUM SERPL-MCNC: 10.1 MG/DL — SIGNIFICANT CHANGE UP (ref 8.4–10.5)
CHLORIDE SERPL-SCNC: 105 MMOL/L — SIGNIFICANT CHANGE UP (ref 96–108)
CO2 SERPL-SCNC: 27 MMOL/L — SIGNIFICANT CHANGE UP (ref 22–31)
CREAT SERPL-MCNC: 0.85 MG/DL — SIGNIFICANT CHANGE UP (ref 0.5–1.3)
EOSINOPHIL # BLD AUTO: 0.03 K/UL — SIGNIFICANT CHANGE UP (ref 0–0.5)
EOSINOPHIL NFR BLD AUTO: 0.2 % — SIGNIFICANT CHANGE UP (ref 0–6)
GLUCOSE SERPL-MCNC: 96 MG/DL — SIGNIFICANT CHANGE UP (ref 70–99)
HCT VFR BLD CALC: 39.1 % — SIGNIFICANT CHANGE UP (ref 34.5–45)
HGB BLD-MCNC: 12.9 G/DL — SIGNIFICANT CHANGE UP (ref 11.5–15.5)
IMM GRANULOCYTES NFR BLD AUTO: 0.4 % — SIGNIFICANT CHANGE UP (ref 0–1.5)
INR BLD: 0.99 — SIGNIFICANT CHANGE UP (ref 0.88–1.16)
LYMPHOCYTES # BLD AUTO: 17.7 % — SIGNIFICANT CHANGE UP (ref 13–44)
LYMPHOCYTES # BLD AUTO: 2.13 K/UL — SIGNIFICANT CHANGE UP (ref 1–3.3)
MCHC RBC-ENTMCNC: 31.4 PG — SIGNIFICANT CHANGE UP (ref 27–34)
MCHC RBC-ENTMCNC: 33 GM/DL — SIGNIFICANT CHANGE UP (ref 32–36)
MCV RBC AUTO: 95.1 FL — SIGNIFICANT CHANGE UP (ref 80–100)
MONOCYTES # BLD AUTO: 0.63 K/UL — SIGNIFICANT CHANGE UP (ref 0–0.9)
MONOCYTES NFR BLD AUTO: 5.2 % — SIGNIFICANT CHANGE UP (ref 2–14)
NEUTROPHILS # BLD AUTO: 9.12 K/UL — HIGH (ref 1.8–7.4)
NEUTROPHILS NFR BLD AUTO: 76 % — SIGNIFICANT CHANGE UP (ref 43–77)
NRBC # BLD: 0 /100 WBCS — SIGNIFICANT CHANGE UP (ref 0–0)
PLATELET # BLD AUTO: 259 K/UL — SIGNIFICANT CHANGE UP (ref 150–400)
POTASSIUM SERPL-MCNC: 4.5 MMOL/L — SIGNIFICANT CHANGE UP (ref 3.5–5.3)
POTASSIUM SERPL-SCNC: 4.5 MMOL/L — SIGNIFICANT CHANGE UP (ref 3.5–5.3)
PROT SERPL-MCNC: 7.7 G/DL — SIGNIFICANT CHANGE UP (ref 6–8.3)
PROTHROM AB SERPL-ACNC: 11.9 SEC — SIGNIFICANT CHANGE UP (ref 10.6–13.6)
RBC # BLD: 4.11 M/UL — SIGNIFICANT CHANGE UP (ref 3.8–5.2)
RBC # FLD: 13.6 % — SIGNIFICANT CHANGE UP (ref 10.3–14.5)
SODIUM SERPL-SCNC: 142 MMOL/L — SIGNIFICANT CHANGE UP (ref 135–145)
WBC # BLD: 12.02 K/UL — HIGH (ref 3.8–10.5)
WBC # FLD AUTO: 12.02 K/UL — HIGH (ref 3.8–10.5)

## 2020-12-13 PROCEDURE — 85025 COMPLETE CBC W/AUTO DIFF WBC: CPT

## 2020-12-13 PROCEDURE — 70551 MRI BRAIN STEM W/O DYE: CPT

## 2020-12-13 PROCEDURE — 70498 CT ANGIOGRAPHY NECK: CPT

## 2020-12-13 PROCEDURE — 96361 HYDRATE IV INFUSION ADD-ON: CPT

## 2020-12-13 PROCEDURE — 70551 MRI BRAIN STEM W/O DYE: CPT | Mod: 26

## 2020-12-13 PROCEDURE — 36415 COLL VENOUS BLD VENIPUNCTURE: CPT

## 2020-12-13 PROCEDURE — 96365 THER/PROPH/DIAG IV INF INIT: CPT | Mod: XU

## 2020-12-13 PROCEDURE — 80053 COMPREHEN METABOLIC PANEL: CPT

## 2020-12-13 PROCEDURE — 96375 TX/PRO/DX INJ NEW DRUG ADDON: CPT | Mod: XU

## 2020-12-13 PROCEDURE — 96366 THER/PROPH/DIAG IV INF ADDON: CPT

## 2020-12-13 PROCEDURE — 70496 CT ANGIOGRAPHY HEAD: CPT | Mod: 26

## 2020-12-13 PROCEDURE — 70450 CT HEAD/BRAIN W/O DYE: CPT

## 2020-12-13 PROCEDURE — 85730 THROMBOPLASTIN TIME PARTIAL: CPT

## 2020-12-13 PROCEDURE — 70496 CT ANGIOGRAPHY HEAD: CPT

## 2020-12-13 PROCEDURE — 82962 GLUCOSE BLOOD TEST: CPT

## 2020-12-13 PROCEDURE — 99231 SBSQ HOSP IP/OBS SF/LOW 25: CPT | Mod: 24

## 2020-12-13 PROCEDURE — 85610 PROTHROMBIN TIME: CPT

## 2020-12-13 PROCEDURE — 99291 CRITICAL CARE FIRST HOUR: CPT | Mod: 25

## 2020-12-13 PROCEDURE — 99291 CRITICAL CARE FIRST HOUR: CPT

## 2020-12-13 PROCEDURE — 0042T: CPT

## 2020-12-13 PROCEDURE — 70450 CT HEAD/BRAIN W/O DYE: CPT | Mod: 26,59

## 2020-12-13 PROCEDURE — 70498 CT ANGIOGRAPHY NECK: CPT | Mod: 26

## 2020-12-13 PROCEDURE — 93010 ELECTROCARDIOGRAM REPORT: CPT

## 2020-12-13 PROCEDURE — 84484 ASSAY OF TROPONIN QUANT: CPT

## 2020-12-13 PROCEDURE — 93005 ELECTROCARDIOGRAM TRACING: CPT

## 2020-12-13 RX ORDER — SODIUM CHLORIDE 9 MG/ML
1000 INJECTION INTRAMUSCULAR; INTRAVENOUS; SUBCUTANEOUS ONCE
Refills: 0 | Status: COMPLETED | OUTPATIENT
Start: 2020-12-13 | End: 2020-12-13

## 2020-12-13 RX ORDER — VALPROIC ACID (AS SODIUM SALT) 250 MG/5ML
2 SOLUTION, ORAL ORAL
Qty: 18 | Refills: 0
Start: 2020-12-13 | End: 2020-12-15

## 2020-12-13 RX ORDER — METOCLOPRAMIDE HCL 10 MG
10 TABLET ORAL ONCE
Refills: 0 | Status: COMPLETED | OUTPATIENT
Start: 2020-12-13 | End: 2020-12-13

## 2020-12-13 RX ORDER — METOCLOPRAMIDE HCL 10 MG
1 TABLET ORAL
Qty: 9 | Refills: 0
Start: 2020-12-13 | End: 2020-12-15

## 2020-12-13 RX ORDER — NORETHINDRONE AND ETHINYL ESTRADIOL 0.4-0.035
0 KIT ORAL
Qty: 0 | Refills: 0 | DISCHARGE

## 2020-12-13 RX ORDER — ACETAMINOPHEN 500 MG
1000 TABLET ORAL ONCE
Refills: 0 | Status: COMPLETED | OUTPATIENT
Start: 2020-12-13 | End: 2020-12-13

## 2020-12-13 RX ORDER — TRAMADOL HYDROCHLORIDE 50 MG/1
1 TABLET ORAL
Qty: 12 | Refills: 0
Start: 2020-12-13 | End: 2020-12-15

## 2020-12-13 RX ADMIN — Medication 400 MILLIGRAM(S): at 14:40

## 2020-12-13 RX ADMIN — Medication 1000 MILLIGRAM(S): at 17:05

## 2020-12-13 RX ADMIN — Medication 10 MILLIGRAM(S): at 14:39

## 2020-12-13 RX ADMIN — SODIUM CHLORIDE 1000 MILLILITER(S): 9 INJECTION INTRAMUSCULAR; INTRAVENOUS; SUBCUTANEOUS at 17:40

## 2020-12-13 RX ADMIN — Medication 1000 MILLIGRAM(S): at 17:15

## 2020-12-13 RX ADMIN — SODIUM CHLORIDE 1000 MILLILITER(S): 9 INJECTION INTRAMUSCULAR; INTRAVENOUS; SUBCUTANEOUS at 14:40

## 2020-12-13 NOTE — ED ADULT NURSE NOTE - IN THE PAST 12 MONTHS HAVE YOU USED DRUGS OTHER THAN THOSE REQUIRED FOR MEDICAL REASON?
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    History of recurrent UTI (urinary tract infection) 2018    Elevated phenytoin level 2018    Aortic regurgitation 2018    CKD (chronic kidney disease), stage III ([address] 2018    Atelectasis, bilateral 2018    Observation for suspected concussion 2018    HB (heart block) 2018    Concussion syndrome 2018    Numbness and tingling of left leg 2017    Mental retardation 2017    Seizure disorder ([address] 2017    Essential hypertension 2017    Unstable gait 2016    Dizzy spells 2016    SOB (shortness of breath) 2016    Chronic CHF ([address] 2016    Mental retardation 2016    Hypoxia 2016    Developmental disability 2016    HTN (hypertension) 2016    Hypokalemia 2016    Generalized seizures ([address] 2016        Past Medical History:   Diagnosis Date    CHF (congestive heart failure) (HCC)     Chronic CHF ([address] 2016    CKD (chronic kidney disease), stage III ([address] 2018    Depression     Developmental disability 2016    Dizzy spells 2016    HTN (hypertension) 2016    Hyperlipidemia     Hypertension     Hypokalemia 2016    Hypoxia 2016    Mental retardation 2016    Mental retardation     Seizures ([address]     SOB (shortness of breath) 2016    Unstable gait 2016     Past WFL    Strength:  Strength RLE  Comment: grossly 3/5  Strength LLE  Comment: grossly 3/5    Neuro:  Balance  Sitting - Static: Fair  Sitting - Dynamic: Fair  Standing - Static: Poor  Standing - Dynamic: Poor     Motor Control  Gross Motor?: WFL  Sensation  Overall Sensation Status: WFL    Bed mobility  Supine to Sit: Moderate assistance  Sit to Supine: Moderate assistance  Scooting: Maximal assistance    Transfers  Sit to Stand: Moderate Assistance  Stand to sit: Moderate Assistance  Bed to Chair: Moderate assistance  Squat Pivot Transfers: Moderate Assistance  Comment: to bsc, not following verbal commands, hand over hand for placement to improve safety    Ambulation  Ambulation?: No(pt is non-ambulatory at baseline)    Activity Tolerance  Activity Tolerance: Patient limited by cognitive status  PT Equipment Recommendations  Equipment Needed: Yes  Other: hospital bed     PT Education  PT Education: Goals;PT Role;Plan of Care;Family Education    ASSESSMENT:   Body structures, Functions, Activity limitations: Decreased functional mobility ; Decreased strength;Decreased balance;Decreased safe awareness  Decision Making: Medium Complexity  History: high  Exam: high  Clinical Presentation: high    Prognosis: Good  Barriers to Learning: memory/cognitive deficits    DISCHARGE RECOMMENDATIONS:  Discharge Recommendations: Continue to assess pending progress    Assessment: Pt demonstrating decline in functional mobility compared to baseline. Per caregiver report, pt requires 24 hr care that is provided however pt was able to feed and perform bed mobility/transfers to w/c with SBA. Pt would benefit from PT to allow to decrease caregiver burden and prevent further functional decline.    Other: hospital bed  REQUIRES PT FOLLOW UP: Yes      PLAN OF CARE:  Plan  Times per week: 3-6  Current Treatment Recommendations: Strengthening, Functional Mobility Training, Neuromuscular Re-education, Home Exercise Program, Equipment No

## 2020-12-13 NOTE — ED PROVIDER NOTE - PATIENT PORTAL LINK FT
You can access the FollowMyHealth Patient Portal offered by [facility] by registering at the following website: [email or web address]. By joining [employer]’s FollowMyHealth portal, you will also be able to view your health information using other applications (apps) compatible with our system.

## 2020-12-13 NOTE — ED PROVIDER NOTE - CLINICAL SUMMARY MEDICAL DECISION MAKING FREE TEXT BOX
Pt c/o ha, vision change L eye.  ? complex migraine, ? cva, less likely primary eye etiology.  Stroke code called.  Plan ct's, labs, stroke team eval.  Reassess.

## 2020-12-13 NOTE — ED ADULT TRIAGE NOTE - CHIEF COMPLAINT QUOTE
" I have migraines for two weeks and since last night I am seeing gray and blurry to my left peripheral vision".

## 2020-12-13 NOTE — ED PROVIDER NOTE - ATTESTATION, MLM
Phone Number Called: [phone] (home)       Message: Left VM for pt to call back.    Left Message for patient to call back: yes       I have reviewed and confirmed nurses' notes for patient's medications, allergies, medical history, and surgical history.

## 2020-12-13 NOTE — ED PROVIDER NOTE - EYES, MLM
Clear bilaterally, pupils equal, round and reactive to light. eomi, visual fields full to confrontation, + photophobia Clear bilaterally, pupils equal, round and reactive to light. eomi, visual fields full to confrontation, + photophobia, lids/lashes nl, cornea nl

## 2020-12-13 NOTE — ED PROVIDER NOTE - CARE PLAN
Principal Discharge DX:	Migraine headache   Principal Discharge DX:	Migraine headache  Secondary Diagnosis:	Vision loss of left eye

## 2020-12-13 NOTE — ED PROVIDER NOTE - OBJECTIVE STATEMENT
46 yo female h/o fibromyalgia, shingles (on chronic suppression), migraine c/o ha similar to her typical migraine x 2 wk w onset of vision loss L lateral visual field on waking from nap (went to sleep at 3) and waking at 5p.  No R vision changes, change in speech/gait, numbness or weakness in ext, neck stiffness, uri sx, fever, head trauma.  No h/o visual changes w migraine in the past.  Pt noted complete grey out of vision on L lateral field yest, now less intense.  No eye pain.  No relief w otc meds (motrin). 44 yo female h/o fibromyalgia, shingles (on chronic suppression), migraine c/o ha similar to her typical migraine x 2 wk w onset of gray area/vision loss L lateral lower visual field on waking from nap "like a halo or grey dot" (went to sleep at 3) and waking at 5p.  No R vision changes, change in speech/gait, numbness or weakness in ext, neck stiffness, uri sx, fever, head trauma.  No h/o visual changes w migraine in the past.  Pt noted complete grey out of vision on L lateral field yest, now less intense.  No eye pain.  No relief w otc meds (motrin).

## 2020-12-13 NOTE — ED PROVIDER NOTE - PROGRESS NOTE DETAILS
CT's neg so far.  Pt feeling better, vision unchanged.  Vision L 20/25, R 20/25, bilat 20/20. Await final ct reads, stroke team recommendations/eval. IOP R 9, L 9.  Ophtho paged to discuss - pt discussed w Dr Mcmillan - he is reviewing pt's chart and results and will call back w recommendations.  Pt reports ha improved and vision also improving.  CT's neg.  Neuro contacted and feel sx 2/2 migraine - cleared for dc and recommend ophtho fu; recommends treatment for status migrainosis tramadol 100 q6 x 3d, valproate 500 q8 x 3d, reglan 10 q8 x 3d, fu Dr Escobar 2 wks. Dr Mcmillan discussed pt's ct's w radiology.  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  MRI ordered.  Pt will be signed out to Dr Thomas at 6p pending MRI result.  If neg, dc w case w ophtho this wk and neuro 2 wk. mri neg. discussed with Dr. Mcmillan. ok to dc, will f/u outpatient

## 2020-12-13 NOTE — ED PROVIDER NOTE - NSFOLLOWUPINSTRUCTIONS_ED_ALL_ED_FT
Migraine    Take tramadol 1 tab every 6 hours for pain.  Take valproic acid 1 tab every 8 hours for pain.  Take reglan 1 tab every 8 hours for pain.    Follow up with Dr Escobar (neuro) in 2 weeks.  Follow up with an ophthalmologist this week.    Return for increased pain, change in behavior, change in vision/speech/gait, numbness or weakness in extremities, vomiting, any other concerns.       Migraine Headache    WHAT YOU NEED TO KNOW:    What is a migraine headache? A migraine is a severe headache. The pain can be so severe that it interferes with your daily activities. A migraine can last a few hours up to several days. The exact cause of migraines is not known.     What can trigger a migraine headache?   •Stress, eye strain, oversleeping, or not getting enough sleep      •Hormone changes in women from birth control pills, pregnancy, menopause, or during a monthly period      •Skipping meals, going too long without eating, or not drinking enough liquids      •Certain foods or drinks such as chocolate, hard cheese, red wine, or drinks that contain caffeine      •Foods that contain gluten, nitrates, MSG, or artificial sweeteners      •Sunlight, bright or flashing lights, loud noises, smoke, or strong smells      •Heat, humidity, or changes in the weather       What are the warning signs that a migraine headache is about to start? Warning signs usually start 15 to 60 minutes before the headache:  •Visual changes (auras), such as blurred vision, temporary blind or bright spots, lines, or hallucinations      •Unusual tiredness or frequent yawning      •Tingling in an arm or leg      What are the signs and symptoms of a migraine headache? A migraine headache usually begins as a dull ache around the eye or temple. The pain may get worse with movement. You may also have the following:  •Pain in your head that may increase to the point that you cannot do everyday activities      •Pain on one or both sides of your head      •Throbbing, pulsing, or pounding pain in your head      •Nausea and vomiting      •Sensitivity to light, noise, or smells      How is a migraine headache diagnosed? Your healthcare provider will ask questions about your headaches. Describe the pain and any other symptoms, such as nausea. Tell the provider if you think anything triggered the pain. The provider will also want to know what you ate and drank before the pain started. Tell the provider about any medical conditions you have or that run in your family. Include any recent stressors you have had. You may also need any of the following:   •A neurologic exam is used to check how your pupils react to light. Your healthcare provider may check your memory, hand grasp, and balance.       •CT or MRI pictures may be taken of your brain. You may be given contrast liquid to help your brain show up better in the pictures. Tell the healthcare provider if you have ever had an allergic reaction to contrast liquid. Do not enter the MRI room with anything metal. Metal can cause serious injury. Tell the healthcare provider if you have any metal in or on your body.       How is a migraine headache treated? Migraines cannot be cured. The goal of treatment is to reduce your symptoms. Take medicine as soon as you feel a migraine begin.   •Prescription pain medicine may be given. Do not wait until the pain is severe before you take your medicine.       •Migraine medicines are used to help prevent a migraine or stop it once it starts.       •Antinausea medicine may be given to calm your stomach and to help prevent vomiting. This medicine can also help relieve pain.      What can I do to manage my symptoms?   •Rest in a dark, quiet room. This will help decrease your pain. Sleep may also help relieve the pain.      •Apply ice to decrease pain. Use an ice pack, or put crushed ice in a plastic bag. Cover the ice pack with a towel and place it on your head. Apply ice for 15 to 20 minutes every hour.      •Apply heat to decrease pain and muscle spasms. Use a small towel dampened with warm water or a heating pad, or sit in a warm bath. Apply heat on the area for 20 to 30 minutes every 2 hours. You may alternate heat and ice.      •Keep a migraine record. Write down when your migraines start and stop. Include your symptoms and what you were doing when a migraine began. Record what you ate or drank for 24 hours before the migraine started. Keep track of what you did to treat your migraine and if it worked. Bring the migraine record with you to visits with your healthcare provider.      What can I do to prevent another migraine headache?   •Do not smoke. Nicotine and other chemicals in cigarettes and cigars can trigger a migraine or make it worse. Ask your healthcare provider for information if you currently smoke and need help to quit. E-cigarettes or smokeless tobacco still contain nicotine. Talk to your healthcare provider before you use these products.       •Do not drink alcohol. Alcohol can trigger a migraine. It can also keep medicines used to treat your migraines from working.      •Get regular exercise. Exercise may help prevent migraines. Talk to your healthcare provider about the best exercise plan for you. Try to get at least 30 minutes of exercise on most days.      •Manage stress. Stress may trigger a migraine. Learn new ways to relax, such as deep breathing.      •Create a sleep schedule. Go to bed and get up at the same times each day. Do not watch television before bed.      •Eat regular meals. Include healthy foods such as include fruit, vegetables, whole-grain breads, low-fat dairy products, beans, lean meat, and fish. Do not have food or drinks that trigger your migraines.      When should I seek immediate care?   •You have a headache that seems different or much worse than your usual migraine headache.      •You have a severe headache with a fever or a stiff neck.       •You have new problems with speech, vision, balance, or movement.      •You feel like you are going to faint, you become confused, or you have a seizure.       When should I contact my healthcare provider?   •Your migraines interfere with your daily activities.       •Your medicines or treatments stop working.      •You have questions or concerns about your condition or care.

## 2020-12-13 NOTE — CONSULT NOTE ADULT - SUBJECTIVE AND OBJECTIVE BOX
**STROKE CODE CONSULT NOTE**    Last known well time: 12/12 at 3pm    HPI: 45y Female with PMHx of migraine headaches, borderline glaucoma, fibromyalgia, preDM who presented to the Ed today due to migraine that started yesterday followed by gray spot in the temporal lower corner of her left eye that started at 5pm yesterday 12/12 upon waking from a nap. Last known normal 3pm, woke up 5pm with this. stroke code called in the ED.  States that her typical migraine can be 10/10, generalized. States that she is sensitive to light, experiences various smells and sometimes has blurry vision. She previously had been taking topamax for migraine but doctor discontinued medication. denies acute onset of numbness, weakness, tingling, slurred speech,, double vision, dizziness,.    CTH with no acute findings.   SBP 120s    T(C): 36.9 (12-13-20 @ 13:47), Max: 36.9 (12-13-20 @ 13:47)  HR: 73 (12-13-20 @ 14:52) (68 - 73)  BP: 116/80 (12-13-20 @ 14:52) (115/76 - 116/80)  RR: 18 (12-13-20 @ 14:52) (18 - 18)  SpO2: 100% (12-13-20 @ 14:52) (99% - 100%)    PAST MEDICAL & SURGICAL HISTORY:  Hernia    Borderline glaucoma    Herniated intervertebral disc of lumbar spine    Shingles    Pre-diabetes    Fibromyalgia    Migraine headache    H/O hernia repair    History of lymph node biopsy  right axilla    History of arthroscopy of left knee    H/O dilation and curettage      FAMILY HISTORY:  Family history of autism in sibling (Sibling)    Family history of borderline diabetes mellitus (Sibling)    Family history of diabetes mellitus (DM) (Grandparent)    Family history of hyperlipidemia (Father, Mother)    Family history of heart disease (Father, Grandparent)    Family history of CABG (Father, Grandparent)    Family history of hypertension (Father, Mother, Grandparent)        SOCIAL HISTORY:  Denies smoking, drinking, or drug use    ROS:   Constitutional: No fever, weight loss or fatigue  Eyes: No eye pain, visual disturbances, or discharge  ENMT:  No difficulty hearing, tinnitus, vertigo; No sinus or throat pain  Neck: No pain or stiffness  Respiratory: No cough, wheezing, chills or hemoptysis  Cardiovascular: No chest pain, palpitations, shortness of breath, dizziness or leg swelling  Gastrointestinal: No abdominal pain. No nausea, vomiting or hematemesis; No diarrhea or constipation. Nohematochezia.  Genitourinary: No dysuria, frequency, hematuria or incontinence  Neurological: As per HPI  Skin: No itching, burning, rashes or lesions   Endocrine: No heat or cold intolerance; No hair loss  Musculoskeletal: No joint pain or swelling; No muscle, back or extremity pain  Psychiatric: No depression, anxiety, mood swings or difficulty sleeping  Heme/Lymph: No easy bruising or bleeding gums    MEDICATIONS  (STANDING):    MEDICATIONS  (PRN):    Allergies    Aleve (Urticaria)  Maxalt (Urticaria; Swelling)  naproxen (Urticaria; Swelling)    Intolerances      Vital Signs Last 24 Hrs  T(C): 36.9 (13 Dec 2020 13:47), Max: 36.9 (13 Dec 2020 13:47)  T(F): 98.5 (13 Dec 2020 13:47), Max: 98.5 (13 Dec 2020 13:47)  HR: 73 (13 Dec 2020 14:52) (68 - 73)  BP: 116/80 (13 Dec 2020 14:52) (115/76 - 116/80)  BP(mean): --  RR: 18 (13 Dec 2020 14:52) (18 - 18)  SpO2: 100% (13 Dec 2020 14:52) (99% - 100%)    Physical exam:  General: No acute distress, awake and alert    Neurologic:  -Mental status: Awake, alert, oriented to person, place, and time. Speech is fluent with intact naming, repetition, and comprehension, no dysarthria. Recent and remote memory intact. Follows commands. Attention/concentration intact. Fund of knowledge appropriate.  -Cranial nerves:   II: Visual fields are full to confrontation.  III, IV, VI: Extraocular movements are intact without nystagmus. Pupils equally round and reactive to light  V:  Facial sensation V1-V3 equal and intact   VII: Face is symmetric with normal eye closure and smile  VIII: Hearing is bilaterally intact to finger rub  IX, X: Uvula is midline and soft palate rises symmetrically  XI: Head turning and shoulder shrug are intact.  XII: Tongue protrudes midline  Motor: Normal bulk and tone. No pronator drift. Strength bilateral upper extremity 5/5, bilateral lower extremities 5/5.  Sensation: Intact to light touch bilaterally. No neglect or extinction on double simultaneous testing.  Coordination: No dysmetria on finger-to-nose and heel-to-shin bilaterally  Reflexes: Downgoing toes bilaterally     NIHSS: 0    Fingerstick Blood Glucose: CAPILLARY BLOOD GLUCOSE  87 (13 Dec 2020 14:11)      POCT Blood Glucose.: 87 mg/dL (13 Dec 2020 13:56)    LABS:                        12.9   12.02 )-----------( 259      ( 13 Dec 2020 14:08 )             39.1     12-13    142  |  105  |  17  ----------------------------<  96  4.5   |  27  |  0.85    Ca    10.1      13 Dec 2020 14:08    TPro  7.7  /  Alb  4.4  /  TBili  0.2  /  DBili  x   /  AST  19  /  ALT  11  /  AlkPhos  68  12-13    PT/INR - ( 13 Dec 2020 14:08 )   PT: 11.9 sec;   INR: 0.99          PTT - ( 13 Dec 2020 14:08 )  PTT:32.3 sec  CARDIAC MARKERS ( 13 Dec 2020 14:08 )  x     / <0.01 ng/mL / x     / x     / x              RADIOLOGY & ADDITIONAL STUDIES:    HCT:  CT Brain Stroke Protocol (12.13.20 @ 14:38) >  IMPRESSION:  No acute intracranial hemorrhage or transcortical infarction.      CTA:  CT Angio Head w/ IV Cont (12.13.20 @ 14:38) >  IMPRESSION:  No stenosis or dissection.  CT Angio Head w/ IV Cont (12.13.20 @ 14:38) >  IMPRESSION:  No proximal intraluminal filling defect, significant stenosis or aneurysm.    CTP:    CT Perfusion w/ Maps w/ IV Cont (12.13.20 @ 14:37) >  IMPRESSION:  No ischemia or infarction.      -----------------------------------------------------------------------------------------------------------------  IV-tPA (Y/N):   No                           Reason IV-tPA not given: out of the window for tpa     ASSESSMENT/PLAN:    45y Female with PMHx of migraine headaches, borderline glaucoma, fibromyalgia, preDM who presented to the Ed today due to migraine that started yesterday followed by gray spot in the temporal lower corner of her left eye that started at 5pm yesterday 12/12 upon waking from a nap. Last known normal 3pm, woke up 5pm with this. stroke code called in the ED. HCT showed no intracranial hemorrhage or infarction. CTA showed no significant stenosis or occlusion. Given that patient ws out of the window, tPA was not administered.   Patient is more likely experiencing status migrainosus vs. primary opthomalgic etiology. Less likely stroke.     Recommendations:  - Optho consult in the ED/outpatient optho follow up   - Recommend Tramadol 100mg q 6 hours for 3 days, Valproate 500mg q 8 for 3 days, and reglan 10mg q8 for 3 days - to treat status migrainosus   - Recommend follow up with Dr. Escobar in 2 weeks        **STROKE CODE CONSULT NOTE**    Last known well time: 12/12 at 3pm    HPI: 45y Female with PMHx of migraine headaches, borderline glaucoma, fibromyalgia, preDM who presented to the Ed today due to migraine that started yesterday followed by gray spot in the temporal lower corner of her left eye that started at 5pm yesterday 12/12 upon waking from a nap. Last known normal 3pm, woke up 5pm with this. stroke code called in the ED.  States that her typical migraine can be 10/10, generalized. States that she is sensitive to light, experiences various smells and sometimes has blurry vision. She previously had been taking topamax for migraine but doctor discontinued medication. denies acute onset of numbness, weakness, tingling, slurred speech,, double vision, dizziness,.    CTH with no acute findings.   SBP 120s    T(C): 36.9 (12-13-20 @ 13:47), Max: 36.9 (12-13-20 @ 13:47)  HR: 73 (12-13-20 @ 14:52) (68 - 73)  BP: 116/80 (12-13-20 @ 14:52) (115/76 - 116/80)  RR: 18 (12-13-20 @ 14:52) (18 - 18)  SpO2: 100% (12-13-20 @ 14:52) (99% - 100%)    PAST MEDICAL & SURGICAL HISTORY:  Hernia    Borderline glaucoma    Herniated intervertebral disc of lumbar spine    Shingles    Pre-diabetes    Fibromyalgia    Migraine headache    H/O hernia repair    History of lymph node biopsy  right axilla    History of arthroscopy of left knee    H/O dilation and curettage      FAMILY HISTORY:  Family history of autism in sibling (Sibling)    Family history of borderline diabetes mellitus (Sibling)    Family history of diabetes mellitus (DM) (Grandparent)    Family history of hyperlipidemia (Father, Mother)    Family history of heart disease (Father, Grandparent)    Family history of CABG (Father, Grandparent)    Family history of hypertension (Father, Mother, Grandparent)        SOCIAL HISTORY:  Denies smoking, drinking, or drug use    ROS:   Constitutional: No fever, weight loss or fatigue  Eyes: No eye pain, visual disturbances, or discharge  ENMT:  No difficulty hearing, tinnitus, vertigo; No sinus or throat pain  Neck: No pain or stiffness  Respiratory: No cough, wheezing, chills or hemoptysis  Cardiovascular: No chest pain, palpitations, shortness of breath, dizziness or leg swelling  Gastrointestinal: No abdominal pain. No nausea, vomiting or hematemesis; No diarrhea or constipation. Nohematochezia.  Genitourinary: No dysuria, frequency, hematuria or incontinence  Neurological: As per HPI  Skin: No itching, burning, rashes or lesions   Endocrine: No heat or cold intolerance; No hair loss  Musculoskeletal: No joint pain or swelling; No muscle, back or extremity pain  Psychiatric: No depression, anxiety, mood swings or difficulty sleeping  Heme/Lymph: No easy bruising or bleeding gums    MEDICATIONS  (STANDING):    MEDICATIONS  (PRN):    Allergies    Aleve (Urticaria)  Maxalt (Urticaria; Swelling)  naproxen (Urticaria; Swelling)    Intolerances      Vital Signs Last 24 Hrs  T(C): 36.9 (13 Dec 2020 13:47), Max: 36.9 (13 Dec 2020 13:47)  T(F): 98.5 (13 Dec 2020 13:47), Max: 98.5 (13 Dec 2020 13:47)  HR: 73 (13 Dec 2020 14:52) (68 - 73)  BP: 116/80 (13 Dec 2020 14:52) (115/76 - 116/80)  BP(mean): --  RR: 18 (13 Dec 2020 14:52) (18 - 18)  SpO2: 100% (13 Dec 2020 14:52) (99% - 100%)    Physical exam:  General: No acute distress, awake and alert    Neurologic:  -Mental status: Awake, alert, oriented to person, place, and time. Speech is fluent with intact naming, repetition, and comprehension, no dysarthria. Recent and remote memory intact. Follows commands. Attention/concentration intact. Fund of knowledge appropriate.  -Cranial nerves:   II: Visual fields are full to confrontation.  III, IV, VI: Extraocular movements are intact without nystagmus. Pupils equally round and reactive to light  V:  Facial sensation V1-V3 equal and intact   VII: Face is symmetric with normal eye closure and smile  VIII: Hearing is bilaterally intact to finger rub  IX, X: Uvula is midline and soft palate rises symmetrically  XI: Head turning and shoulder shrug are intact.  XII: Tongue protrudes midline  Motor: Normal bulk and tone. No pronator drift. Strength bilateral upper extremity 5/5, bilateral lower extremities 5/5.  Sensation: Intact to light touch bilaterally. No neglect or extinction on double simultaneous testing.  Coordination: No dysmetria on finger-to-nose and heel-to-shin bilaterally  Reflexes: Downgoing toes bilaterally     NIHSS: 0    Fingerstick Blood Glucose: CAPILLARY BLOOD GLUCOSE  87 (13 Dec 2020 14:11)      POCT Blood Glucose.: 87 mg/dL (13 Dec 2020 13:56)    LABS:                        12.9   12.02 )-----------( 259      ( 13 Dec 2020 14:08 )             39.1     12-13    142  |  105  |  17  ----------------------------<  96  4.5   |  27  |  0.85    Ca    10.1      13 Dec 2020 14:08    TPro  7.7  /  Alb  4.4  /  TBili  0.2  /  DBili  x   /  AST  19  /  ALT  11  /  AlkPhos  68  12-13    PT/INR - ( 13 Dec 2020 14:08 )   PT: 11.9 sec;   INR: 0.99          PTT - ( 13 Dec 2020 14:08 )  PTT:32.3 sec  CARDIAC MARKERS ( 13 Dec 2020 14:08 )  x     / <0.01 ng/mL / x     / x     / x              RADIOLOGY & ADDITIONAL STUDIES:    HCT:  CT Brain Stroke Protocol (12.13.20 @ 14:38) >  IMPRESSION:  No acute intracranial hemorrhage or transcortical infarction.      CTA:  CT Angio Head w/ IV Cont (12.13.20 @ 14:38) >  IMPRESSION:  No stenosis or dissection.  CT Angio Head w/ IV Cont (12.13.20 @ 14:38) >  IMPRESSION:  No proximal intraluminal filling defect, significant stenosis or aneurysm.    CTP:    CT Perfusion w/ Maps w/ IV Cont (12.13.20 @ 14:37) >  IMPRESSION:  No ischemia or infarction.    MRI:  No hydrocephalus, midline shift, intracranial hemorrhage or cerebral infarction.  Normal T2 signal void involving the included arterial vasculature.      -----------------------------------------------------------------------------------------------------------------  IV-tPA (Y/N):   No                           Reason IV-tPA not given: out of the window for tpa     ASSESSMENT/PLAN:    45y Female with PMHx of migraine headaches, borderline glaucoma, fibromyalgia, preDM who presented to the Ed today due to migraine that started yesterday followed by gray spot in the temporal lower corner of her left eye that started at 5pm yesterday 12/12 upon waking from a nap. Last known normal 3pm, woke up 5pm with this. stroke code called in the ED. HCT showed no intracranial hemorrhage or infarction. CTA showed no significant stenosis or occlusion. Given that patient ws out of the window, tPA was not administered.   Patient is more likely experiencing status migrainosus vs. primary opthomalgic etiology. Less likely stroke.     Recommendations:  - Optho consult in the ED/outpatient optho follow up   - Recommend Tramadol 100mg q 6 hours for 3 days, Valproate 500mg q 8 for 3 days, and reglan 10mg q8 for 3 days - to treat status migrainosus   - Recommend follow up with Dr. Escobar in 2 weeks        **STROKE CODE CONSULT NOTE**    Last known well time: 12/12 at 3pm    HPI: 45y Female with PMHx of migraine headaches, borderline glaucoma, fibromyalgia, preDM who presented to the Ed today due to migraine that started yesterday followed by gray spot in the temporal lower corner of her left eye that started at 5pm yesterday 12/12 upon waking from a nap. Last known normal 3pm, woke up 5pm with this. stroke code called in the ED.  States that her typical migraine can be 10/10, generalized. States that she is sensitive to light, experiences various smells and sometimes has blurry vision. She previously had been taking topamax for migraine but doctor discontinued medication. denies acute onset of numbness, weakness, tingling, slurred speech,, double vision, dizziness,.    CTH with no acute findings.   SBP 120s    T(C): 36.9 (12-13-20 @ 13:47), Max: 36.9 (12-13-20 @ 13:47)  HR: 73 (12-13-20 @ 14:52) (68 - 73)  BP: 116/80 (12-13-20 @ 14:52) (115/76 - 116/80)  RR: 18 (12-13-20 @ 14:52) (18 - 18)  SpO2: 100% (12-13-20 @ 14:52) (99% - 100%)    PAST MEDICAL & SURGICAL HISTORY:  Hernia    Borderline glaucoma    Herniated intervertebral disc of lumbar spine    Shingles    Pre-diabetes    Fibromyalgia    Migraine headache    H/O hernia repair    History of lymph node biopsy  right axilla    History of arthroscopy of left knee    H/O dilation and curettage      FAMILY HISTORY:  Family history of autism in sibling (Sibling)    Family history of borderline diabetes mellitus (Sibling)    Family history of diabetes mellitus (DM) (Grandparent)    Family history of hyperlipidemia (Father, Mother)    Family history of heart disease (Father, Grandparent)    Family history of CABG (Father, Grandparent)    Family history of hypertension (Father, Mother, Grandparent)        SOCIAL HISTORY:  Denies smoking, drinking, or drug use    ROS:   Constitutional: No fever, weight loss or fatigue  Eyes: No eye pain, visual disturbances, or discharge  ENMT:  No difficulty hearing, tinnitus, vertigo; No sinus or throat pain  Neck: No pain or stiffness  Respiratory: No cough, wheezing, chills or hemoptysis  Cardiovascular: No chest pain, palpitations, shortness of breath, dizziness or leg swelling  Gastrointestinal: No abdominal pain. No nausea, vomiting or hematemesis; No diarrhea or constipation. Nohematochezia.  Genitourinary: No dysuria, frequency, hematuria or incontinence  Neurological: As per HPI  Skin: No itching, burning, rashes or lesions   Endocrine: No heat or cold intolerance; No hair loss  Musculoskeletal: No joint pain or swelling; No muscle, back or extremity pain  Psychiatric: No depression, anxiety, mood swings or difficulty sleeping  Heme/Lymph: No easy bruising or bleeding gums    MEDICATIONS  (STANDING):    MEDICATIONS  (PRN):    Allergies    Aleve (Urticaria)  Maxalt (Urticaria; Swelling)  naproxen (Urticaria; Swelling)    Intolerances      Vital Signs Last 24 Hrs  T(C): 36.9 (13 Dec 2020 13:47), Max: 36.9 (13 Dec 2020 13:47)  T(F): 98.5 (13 Dec 2020 13:47), Max: 98.5 (13 Dec 2020 13:47)  HR: 73 (13 Dec 2020 14:52) (68 - 73)  BP: 116/80 (13 Dec 2020 14:52) (115/76 - 116/80)  BP(mean): --  RR: 18 (13 Dec 2020 14:52) (18 - 18)  SpO2: 100% (13 Dec 2020 14:52) (99% - 100%)    Physical exam:  General: No acute distress, awake and alert    Neurologic:  -Mental status: Awake, alert, oriented to person, place, and time. Speech is fluent with intact naming, repetition, and comprehension, no dysarthria. Recent and remote memory intact. Follows commands. Attention/concentration intact. Fund of knowledge appropriate.  -Cranial nerves:   II: Visual fields are full to confrontation.  III, IV, VI: Extraocular movements are intact without nystagmus. Pupils equally round and reactive to light  V:  Facial sensation V1-V3 equal and intact   VII: Face is symmetric with normal eye closure and smile  VIII: Hearing is bilaterally intact to finger rub  IX, X: Uvula is midline and soft palate rises symmetrically  XI: Head turning and shoulder shrug are intact.  XII: Tongue protrudes midline  Motor: Normal bulk and tone. No pronator drift. Strength bilateral upper extremity 5/5, bilateral lower extremities 5/5.  Sensation: Intact to light touch bilaterally. No neglect or extinction on double simultaneous testing.  Coordination: No dysmetria on finger-to-nose and heel-to-shin bilaterally  Reflexes: Downgoing toes bilaterally     NIHSS: 0    Fingerstick Blood Glucose: CAPILLARY BLOOD GLUCOSE  87 (13 Dec 2020 14:11)      POCT Blood Glucose.: 87 mg/dL (13 Dec 2020 13:56)    LABS:                        12.9   12.02 )-----------( 259      ( 13 Dec 2020 14:08 )             39.1     12-13    142  |  105  |  17  ----------------------------<  96  4.5   |  27  |  0.85    Ca    10.1      13 Dec 2020 14:08    TPro  7.7  /  Alb  4.4  /  TBili  0.2  /  DBili  x   /  AST  19  /  ALT  11  /  AlkPhos  68  12-13    PT/INR - ( 13 Dec 2020 14:08 )   PT: 11.9 sec;   INR: 0.99          PTT - ( 13 Dec 2020 14:08 )  PTT:32.3 sec  CARDIAC MARKERS ( 13 Dec 2020 14:08 )  x     / <0.01 ng/mL / x     / x     / x              RADIOLOGY & ADDITIONAL STUDIES:    HCT:  CT Brain Stroke Protocol (12.13.20 @ 14:38) >  IMPRESSION:  No acute intracranial hemorrhage or transcortical infarction.      CTA:  CT Angio Head w/ IV Cont (12.13.20 @ 14:38) >  IMPRESSION:  No stenosis or dissection.  CT Angio Head w/ IV Cont (12.13.20 @ 14:38) >  IMPRESSION:  No proximal intraluminal filling defect, significant stenosis or aneurysm.    CTP:    CT Perfusion w/ Maps w/ IV Cont (12.13.20 @ 14:37) >  IMPRESSION:  No ischemia or infarction.    MRI:  No hydrocephalus, midline shift, intracranial hemorrhage or cerebral infarction.  Normal T2 signal void involving the included arterial vasculature.      -----------------------------------------------------------------------------------------------------------------  IV-tPA (Y/N):   No                           Reason IV-tPA not given: out of the window for tpa     ASSESSMENT/PLAN:    45y Female with PMHx of migraine headaches, borderline glaucoma, fibromyalgia, preDM who presented to the Ed today due to migraine that started yesterday followed by gray spot in the temporal lower corner of her left eye that started at 5pm yesterday 12/12 upon waking from a nap. Last known normal 3pm, woke up 5pm with this. stroke code called in the ED. HCT showed no intracranial hemorrhage or infarction. CTA showed no significant stenosis or occlusion. Given that patient ws out of the window, tPA was not administered.   Patient is more likely experiencing status migrainosus vs. primary opthomalgic etiology. Less likely stroke.     Recommendations:  - Optho consult in the ED/outpatient optho follow up   - Recommend Tramadol 100mg q 6 hours for 3 days, Valproate 500mg q 8 for 3 days, and reglan 10mg q8 for 3 days - to treat status migrainosus   - Recommend follow up with Dr. Escobar in 2 weeks   - Recommend ophthalmology consultation given Hx of borderline glaucoma

## 2020-12-13 NOTE — ED ADULT NURSE REASSESSMENT NOTE - NS ED NURSE REASSESS COMMENT FT1
Received pt from previous RN, pt A/OX3, no acute distress, pt verbalized relief of pain to the head at this time. Pt awaiting MRI results. Family at bedside, nursing care and monitoring in progress.
Pt is no longer dizzi, her HA has gotten better s/p meds. Pt ambulates with steady gait.

## 2020-12-13 NOTE — ED ADULT NURSE NOTE - OBJECTIVE STATEMENT
Pt c/o HA likely migraine since 5pm yesterday. Pt took a nap yesterday at 3pm feeling fine, and woke up at 5 pm with 7 out of 10 HA with nausea and vomiting. Pt now c/o dizziness and left eye with upper and lower quadrant grey dot. Pt was able to see the finger test in all fields. Pt states this does feel like her migraines however she never had vision change in the past.

## 2020-12-14 ENCOUNTER — NON-APPOINTMENT (OUTPATIENT)
Age: 45
End: 2020-12-14

## 2020-12-14 RX ORDER — TRAMADOL HYDROCHLORIDE 50 MG/1
2 TABLET ORAL
Qty: 24 | Refills: 0
Start: 2020-12-14 | End: 2020-12-16

## 2020-12-17 DIAGNOSIS — H54.7 UNSPECIFIED VISUAL LOSS: ICD-10-CM

## 2020-12-17 DIAGNOSIS — G43.909 MIGRAINE, UNSPECIFIED, NOT INTRACTABLE, WITHOUT STATUS MIGRAINOSUS: ICD-10-CM

## 2020-12-17 DIAGNOSIS — R51.9 HEADACHE, UNSPECIFIED: ICD-10-CM

## 2021-04-02 ENCOUNTER — APPOINTMENT (OUTPATIENT)
Dept: NEUROLOGY | Facility: CLINIC | Age: 46
End: 2021-04-02
Payer: COMMERCIAL

## 2021-04-02 VITALS
SYSTOLIC BLOOD PRESSURE: 110 MMHG | HEART RATE: 48 BPM | RESPIRATION RATE: 16 BRPM | BODY MASS INDEX: 31.18 KG/M2 | OXYGEN SATURATION: 100 % | TEMPERATURE: 98 F | HEIGHT: 63 IN | WEIGHT: 176 LBS | DIASTOLIC BLOOD PRESSURE: 71 MMHG

## 2021-04-02 DIAGNOSIS — Z86.19 PERSONAL HISTORY OF OTHER INFECTIOUS AND PARASITIC DISEASES: ICD-10-CM

## 2021-04-02 DIAGNOSIS — R51.9 HEADACHE, UNSPECIFIED: ICD-10-CM

## 2021-04-02 DIAGNOSIS — H53.419 SCOTOMA INVOLVING CENTRAL AREA, UNSPECIFIED EYE: ICD-10-CM

## 2021-04-02 PROCEDURE — 99214 OFFICE O/P EST MOD 30 MIN: CPT

## 2021-04-02 PROCEDURE — 99072 ADDL SUPL MATRL&STAF TM PHE: CPT

## 2021-04-02 NOTE — ASSESSMENT
[FreeTextEntry1] : Possible migraine with visual aura, although time course is atypical (aura typically lasts for 15-30 minutes, not two days)\par Regardless, symptoms have improved with topamax, and imaging ruled out more concerning cause\par Not likely IIH since headache has resolved\par Continue topamax 50mg qhs\par Return as needed

## 2021-04-02 NOTE — PHYSICAL EXAM
[FreeTextEntry1] : Gen: appears well, well-nourished, no acute distress\par \par MS: awake, alert, oriented, speech fluent, comprehension intact, good fund of knowledge, recent and remote memory intact, attention intact\par \par CN: PERRL, EOMI, visual fields full, facial strength and sensation intact and symmetric, palate elevation symmetric, tongue midline, no tongue atrophy or fasciculations\par \par Motor: normal bulk and tone, 5/5 strength throughout, no abnormal movements\par \par Sensory: light touch intact and symmetric throughout\par \par Reflexes: 2+ symmetric throughout\par \par Coordination: no dysmetria on finger to nose, Romberg negative\par \par Gait: normal, can tandem without difficulty

## 2021-04-02 NOTE — CONSULT LETTER
[Dear  ___] : Dear  [unfilled], [Consult Letter:] : I had the pleasure of evaluating your patient, [unfilled]. [Please see my note below.] : Please see my note below. [Consult Closing:] : Thank you very much for allowing me to participate in the care of this patient.  If you have any questions, please do not hesitate to contact me. [Sincerely,] : Sincerely, [FreeTextEntry3] : Jacobo Thapa M.D.\par Neurology, Electromyography and Neuromuscular Medicine\par [facility]\par \par  of Neurology\par [facility] / [facility] [facility] [employer]

## 2021-04-02 NOTE — HISTORY OF PRESENT ILLNESS
[FreeTextEntry1] : Referred by Dr. Savage for headache\par Last seen by me in 2019 for paresthesias in hands and feet\par \par In December 2020 she developed severe headache, bilateral, "felt like it was going to explode"; a week later she woke up with decreased vision on the left side that lasted for 2 days. \par She had migraines in the past but no visual symptoms before this one \par she was  started back on topamax which helped headaches subside - she is only taking 50mg at night because taking it during the day makes her drowsy\par \par Reviewed:\par CT head, CTA head/neck normal \par \par Personally reviewed and interpreted:\par MRI Brain 12/2020 - normal \par

## 2021-10-07 ENCOUNTER — EMERGENCY (EMERGENCY)
Facility: HOSPITAL | Age: 46
LOS: 1 days | Discharge: HOME | End: 2021-10-07
Admitting: EMERGENCY MEDICINE
Payer: COMMERCIAL

## 2021-10-07 VITALS
HEART RATE: 84 BPM | DIASTOLIC BLOOD PRESSURE: 79 MMHG | RESPIRATION RATE: 16 BRPM | OXYGEN SATURATION: 98 % | WEIGHT: 181 LBS | HEIGHT: 63 IN | TEMPERATURE: 98 F | SYSTOLIC BLOOD PRESSURE: 120 MMHG

## 2021-10-07 DIAGNOSIS — Z98.89 OTHER SPECIFIED POSTPROCEDURAL STATES: Chronic | ICD-10-CM

## 2021-10-07 DIAGNOSIS — Z86.39 PERSONAL HISTORY OF OTHER ENDOCRINE, NUTRITIONAL AND METABOLIC DISEASE: ICD-10-CM

## 2021-10-07 DIAGNOSIS — M79.7 FIBROMYALGIA: ICD-10-CM

## 2021-10-07 DIAGNOSIS — G43.909 MIGRAINE, UNSPECIFIED, NOT INTRACTABLE, WITHOUT STATUS MIGRAINOSUS: ICD-10-CM

## 2021-10-07 DIAGNOSIS — Z86.19 PERSONAL HISTORY OF OTHER INFECTIOUS AND PARASITIC DISEASES: ICD-10-CM

## 2021-10-07 DIAGNOSIS — Z88.5 ALLERGY STATUS TO NARCOTIC AGENT: ICD-10-CM

## 2021-10-07 DIAGNOSIS — Z87.19 PERSONAL HISTORY OF OTHER DISEASES OF THE DIGESTIVE SYSTEM: ICD-10-CM

## 2021-10-07 DIAGNOSIS — M54.6 PAIN IN THORACIC SPINE: ICD-10-CM

## 2021-10-07 DIAGNOSIS — Z88.6 ALLERGY STATUS TO ANALGESIC AGENT: ICD-10-CM

## 2021-10-07 LAB
ALBUMIN SERPL ELPH-MCNC: 4.4 G/DL — SIGNIFICANT CHANGE UP (ref 3.3–5)
ALP SERPL-CCNC: 55 U/L — SIGNIFICANT CHANGE UP (ref 40–120)
ALT FLD-CCNC: 11 U/L — SIGNIFICANT CHANGE UP (ref 10–45)
ANION GAP SERPL CALC-SCNC: 10 MMOL/L — SIGNIFICANT CHANGE UP (ref 5–17)
APTT BLD: 29.6 SEC — SIGNIFICANT CHANGE UP (ref 27.5–35.5)
AST SERPL-CCNC: 18 U/L — SIGNIFICANT CHANGE UP (ref 10–40)
BASOPHILS # BLD AUTO: 0.05 K/UL — SIGNIFICANT CHANGE UP (ref 0–0.2)
BASOPHILS NFR BLD AUTO: 0.5 % — SIGNIFICANT CHANGE UP (ref 0–2)
BILIRUB SERPL-MCNC: <0.2 MG/DL — SIGNIFICANT CHANGE UP (ref 0.2–1.2)
BUN SERPL-MCNC: 13 MG/DL — SIGNIFICANT CHANGE UP (ref 7–23)
CALCIUM SERPL-MCNC: 9.9 MG/DL — SIGNIFICANT CHANGE UP (ref 8.4–10.5)
CHLORIDE SERPL-SCNC: 106 MMOL/L — SIGNIFICANT CHANGE UP (ref 96–108)
CO2 SERPL-SCNC: 27 MMOL/L — SIGNIFICANT CHANGE UP (ref 22–31)
CREAT SERPL-MCNC: 0.93 MG/DL — SIGNIFICANT CHANGE UP (ref 0.5–1.3)
D DIMER BLD IA.RAPID-MCNC: <150 NG/ML DDU — SIGNIFICANT CHANGE UP
EOSINOPHIL # BLD AUTO: 0.08 K/UL — SIGNIFICANT CHANGE UP (ref 0–0.5)
EOSINOPHIL NFR BLD AUTO: 0.8 % — SIGNIFICANT CHANGE UP (ref 0–6)
GLUCOSE SERPL-MCNC: 107 MG/DL — HIGH (ref 70–99)
HCT VFR BLD CALC: 39.5 % — SIGNIFICANT CHANGE UP (ref 34.5–45)
HGB BLD-MCNC: 13 G/DL — SIGNIFICANT CHANGE UP (ref 11.5–15.5)
IMM GRANULOCYTES NFR BLD AUTO: 0.2 % — SIGNIFICANT CHANGE UP (ref 0–1.5)
INR BLD: 0.9 — SIGNIFICANT CHANGE UP (ref 0.88–1.16)
LYMPHOCYTES # BLD AUTO: 2.45 K/UL — SIGNIFICANT CHANGE UP (ref 1–3.3)
LYMPHOCYTES # BLD AUTO: 23 % — SIGNIFICANT CHANGE UP (ref 13–44)
MCHC RBC-ENTMCNC: 31 PG — SIGNIFICANT CHANGE UP (ref 27–34)
MCHC RBC-ENTMCNC: 32.9 GM/DL — SIGNIFICANT CHANGE UP (ref 32–36)
MCV RBC AUTO: 94.3 FL — SIGNIFICANT CHANGE UP (ref 80–100)
MONOCYTES # BLD AUTO: 0.67 K/UL — SIGNIFICANT CHANGE UP (ref 0–0.9)
MONOCYTES NFR BLD AUTO: 6.3 % — SIGNIFICANT CHANGE UP (ref 2–14)
NEUTROPHILS # BLD AUTO: 7.39 K/UL — SIGNIFICANT CHANGE UP (ref 1.8–7.4)
NEUTROPHILS NFR BLD AUTO: 69.2 % — SIGNIFICANT CHANGE UP (ref 43–77)
NRBC # BLD: 0 /100 WBCS — SIGNIFICANT CHANGE UP (ref 0–0)
PLATELET # BLD AUTO: 307 K/UL — SIGNIFICANT CHANGE UP (ref 150–400)
POTASSIUM SERPL-MCNC: 4.5 MMOL/L — SIGNIFICANT CHANGE UP (ref 3.5–5.3)
POTASSIUM SERPL-SCNC: 4.5 MMOL/L — SIGNIFICANT CHANGE UP (ref 3.5–5.3)
PROT SERPL-MCNC: 7.7 G/DL — SIGNIFICANT CHANGE UP (ref 6–8.3)
PROTHROM AB SERPL-ACNC: 10.9 SEC — SIGNIFICANT CHANGE UP (ref 10.6–13.6)
RBC # BLD: 4.19 M/UL — SIGNIFICANT CHANGE UP (ref 3.8–5.2)
RBC # FLD: 13.8 % — SIGNIFICANT CHANGE UP (ref 10.3–14.5)
SODIUM SERPL-SCNC: 143 MMOL/L — SIGNIFICANT CHANGE UP (ref 135–145)
WBC # BLD: 10.66 K/UL — HIGH (ref 3.8–10.5)
WBC # FLD AUTO: 10.66 K/UL — HIGH (ref 3.8–10.5)

## 2021-10-07 PROCEDURE — 36415 COLL VENOUS BLD VENIPUNCTURE: CPT

## 2021-10-07 PROCEDURE — 85610 PROTHROMBIN TIME: CPT

## 2021-10-07 PROCEDURE — 99284 EMERGENCY DEPT VISIT MOD MDM: CPT

## 2021-10-07 PROCEDURE — 85379 FIBRIN DEGRADATION QUANT: CPT

## 2021-10-07 PROCEDURE — 85730 THROMBOPLASTIN TIME PARTIAL: CPT

## 2021-10-07 PROCEDURE — 99284 EMERGENCY DEPT VISIT MOD MDM: CPT | Mod: 25

## 2021-10-07 PROCEDURE — 80053 COMPREHEN METABOLIC PANEL: CPT

## 2021-10-07 PROCEDURE — 96374 THER/PROPH/DIAG INJ IV PUSH: CPT

## 2021-10-07 PROCEDURE — 85025 COMPLETE CBC W/AUTO DIFF WBC: CPT

## 2021-10-07 RX ORDER — IBUPROFEN 200 MG
1 TABLET ORAL
Qty: 21 | Refills: 0
Start: 2021-10-07

## 2021-10-07 RX ORDER — CYCLOBENZAPRINE HYDROCHLORIDE 10 MG/1
1 TABLET, FILM COATED ORAL
Qty: 14 | Refills: 0
Start: 2021-10-07

## 2021-10-07 RX ORDER — KETOROLAC TROMETHAMINE 30 MG/ML
15 SYRINGE (ML) INJECTION ONCE
Refills: 0 | Status: DISCONTINUED | OUTPATIENT
Start: 2021-10-07 | End: 2021-10-07

## 2021-10-07 RX ORDER — CYCLOBENZAPRINE HYDROCHLORIDE 10 MG/1
10 TABLET, FILM COATED ORAL ONCE
Refills: 0 | Status: COMPLETED | OUTPATIENT
Start: 2021-10-07 | End: 2021-10-07

## 2021-10-07 RX ORDER — LIDOCAINE 4 G/100G
1 CREAM TOPICAL
Qty: 7 | Refills: 0
Start: 2021-10-07 | End: 2021-10-13

## 2021-10-07 RX ADMIN — Medication 15 MILLIGRAM(S): at 17:04

## 2021-10-07 RX ADMIN — CYCLOBENZAPRINE HYDROCHLORIDE 10 MILLIGRAM(S): 10 TABLET, FILM COATED ORAL at 17:04

## 2021-10-07 NOTE — ED PROVIDER NOTE - PATIENT PORTAL LINK FT
You can access the FollowMyHealth Patient Portal offered by [facility] by registering at the following website: [email or web address]. By joining [facility]’s FollowMyHealth portal, you will also be able to view your health information using other applications (apps) compatible with our system.

## 2021-10-07 NOTE — ED PROVIDER NOTE - NSFOLLOWUPINSTRUCTIONS_ED_ALL_ED_FT
Back Pain    WHAT YOU NEED TO KNOW:    Back pain is common. You may have back pain and muscle spasms. You may feel sore or stiff on one or both sides of your back. The pain may spread to your lower body. Conditions that affect the spine, joints, or muscles can cause back pain. These may include arthritis, spinal stenosis (narrowing of the spinal column), muscle tension, or breakdown of the spinal discs.    DISCHARGE INSTRUCTIONS:    Call your local emergency number (911 in the ) if:   •You have severe back pain with chest pain.      •You cannot control your urine or bowel movements.      •Your pain becomes so severe that you cannot walk.      Return to the emergency department if:   •You have pain, numbness, or weakness in one or both legs.      •You have severe back pain, nausea, and vomiting.      •You have severe back pain that spreads to your side or genital area.      Call your doctor if:   •You have back pain that does not get better with rest and pain medicine.      •You have a fever.      •You have pain that worsens when you are on your back or when you rest.      •You have pain that worsens when you cough or sneeze.      •You lose weight without trying.      •You have questions or concerns about your condition or care.      Medicines: You may need any of the following:   •NSAIDs, such as ibuprofen, help decrease swelling, pain, and fever. This medicine is available with or without a doctor's order. NSAIDs can cause stomach bleeding or kidney problems in certain people. If you take blood thinner medicine, always ask your healthcare provider if NSAIDs are safe for you. Always read the medicine label and follow directions.      •Acetaminophen decreases pain and fever. It is available without a doctor's order. Ask how much to take and how often to take it. Follow directions. Read the labels of all other medicines you are using to see if they also contain acetaminophen, or ask your doctor or pharmacist. Acetaminophen can cause liver damage if not taken correctly. Do not use more than 4 grams (4,000 milligrams) total of acetaminophen in one day.       •Muscle relaxers help decrease muscle spasms and back pain.      •Prescription pain medicine may be given. Ask your healthcare provider how to take this medicine safely. Some prescription pain medicines contain acetaminophen. Do not take other medicines that contain acetaminophen without talking to your healthcare provider. Too much acetaminophen may cause liver damage. Prescription pain medicine may cause constipation. Ask your healthcare provider how to prevent or treat constipation.       •Take your medicine as directed. Contact your healthcare provider if you think your medicine is not helping or if you have side effects. Tell him or her if you are allergic to any medicine. Keep a list of the medicines, vitamins, and herbs you take. Include the amounts, and when and why you take them. Bring the list or the pill bottles to follow-up visits. Carry your medicine list with you in case of an emergency.      How to manage your back pain:   •Apply ice on your back for 15 to 20 minutes every hour or as directed. Use an ice pack, or put crushed ice in a plastic bag. Cover it with a towel before you apply it to your skin. Ice helps prevent tissue damage and decreases pain.      •Apply heat on your back for 20 to 30 minutes every 2 hours for as many days as directed. Heat helps decrease pain and muscle spasms.      •Stay active as much as you can without causing more pain. Bed rest could make your back pain worse. Avoid heavy lifting until your pain is gone.      •Go to physical therapy as directed. A physical therapist can teach you exercises to help improve movement and strength, and to decrease pain.      Follow up with your doctor in 2 weeks, or as directed: You might need to see a specialist. Write down your questions so you remember to ask them during your visits.

## 2021-10-07 NOTE — ED ADULT TRIAGE NOTE - CHIEF COMPLAINT QUOTE
Pt c/o right shoulder blade pain, worse with movement of right arm. Denies known injuries, chest pain, shortness of breath, fevers.

## 2021-10-07 NOTE — ED ADULT NURSE NOTE - NSICDXPASTSURGICALHX_GEN_ALL_CORE_FT
PAST SURGICAL HISTORY:  H/O dilation and curettage     H/O hernia repair     History of arthroscopy of left knee     History of lymph node biopsy right axilla

## 2021-10-07 NOTE — ED ADULT NURSE NOTE - OBJECTIVE STATEMENT
46y female c/o R shoulder blade pain radiating down R arm since 6pm last night. pt denies injury/ trauma. no deformity or bruising noted. ambulates in ED w/ steady gait. pain not better or worse w/ movement. pt denies taking anything for pain today. denies SOB, CP, n/v/d, headache, dizziness, fever, chills. No acute distress noted at this time.

## 2021-10-07 NOTE — ED PROVIDER NOTE - OBJECTIVE STATEMENT
47 yo f presents c/o right upper back pain for the past 2 days.  Pt stating pain is worse with movement, located around her shoulder blade and feels "a pulling" to the area.  Pt went to urgent care and was sent to ED to r/o PE.  Pt denies CP, SOB, hx DVT/PE, leg swelling, recent travel, recent surgery, all other ROS negative.

## 2021-10-07 NOTE — ED PROVIDER NOTE - CLINICAL SUMMARY MEDICAL DECISION MAKING FREE TEXT BOX
45 y/o f presents c/o right upper back pain for the past 2 days.  VSS in ED, pt with no neuro deficits, pain more likely msk etiology.  Pt has point tenderness to right upper back, improvement with toradol/flexeril.  Pt sent from  to r/o PE, is on OCP which is sole risk factor, Wells' score of 0, d-dimer neg, no indication for CTA chest.  Will d/c with rx flexeril and ibuprofen, lidocaine patches, to f/u with pmd.

## 2021-10-07 NOTE — ED ADULT NURSE NOTE - NSICDXPASTMEDICALHX_GEN_ALL_CORE_FT
PAST MEDICAL HISTORY:  Borderline glaucoma     Fibromyalgia     Hernia     Herniated intervertebral disc of lumbar spine     Migraine headache     Pre-diabetes     Shingles

## 2021-10-07 NOTE — ED ADULT NURSE NOTE - NSICDXFAMILYHX_GEN_ALL_CORE_FT
FAMILY HISTORY:  Father  Still living? Unknown  Family history of CABG, Age at diagnosis: Age Unknown  Family history of heart disease, Age at diagnosis: Age Unknown  Family history of hyperlipidemia, Age at diagnosis: Age Unknown  Family history of hypertension, Age at diagnosis: Age Unknown    Mother  Still living? Yes, Estimated age: Age Unknown  Family history of hyperlipidemia, Age at diagnosis: Age Unknown  Family history of hypertension, Age at diagnosis: Age Unknown    Sibling  Still living? Yes, Estimated age: Age Unknown  Family history of autism in sibling, Age at diagnosis: Age Unknown  Family history of borderline diabetes mellitus, Age at diagnosis: Age Unknown    Grandparent  Still living? Yes, Estimated age: Age Unknown  Family history of CABG, Age at diagnosis: Age Unknown  Family history of diabetes mellitus (DM), Age at diagnosis: Age Unknown  Family history of heart disease, Age at diagnosis: Age Unknown  Family history of hypertension, Age at diagnosis: Age Unknown

## 2021-10-07 NOTE — ED PROVIDER NOTE - MUSCULOSKELETAL, MLM
range of motion is not limited, +TTP to right scapula area with no overlying skin discoloration, no midline spine tenderness

## 2022-05-20 ENCOUNTER — APPOINTMENT (OUTPATIENT)
Dept: NEUROLOGY | Facility: CLINIC | Age: 47
End: 2022-05-20

## 2022-07-13 ENCOUNTER — APPOINTMENT (OUTPATIENT)
Dept: NEUROLOGY | Facility: CLINIC | Age: 47
End: 2022-07-13

## 2024-01-31 ENCOUNTER — APPOINTMENT (OUTPATIENT)
Dept: BARIATRICS | Facility: CLINIC | Age: 49
End: 2024-01-31

## 2024-01-31 VITALS — WEIGHT: 138 LBS | BODY MASS INDEX: 24.45 KG/M2 | HEIGHT: 63 IN
